# Patient Record
Sex: MALE | Race: WHITE | Employment: FULL TIME | URBAN - METROPOLITAN AREA
[De-identification: names, ages, dates, MRNs, and addresses within clinical notes are randomized per-mention and may not be internally consistent; named-entity substitution may affect disease eponyms.]

---

## 2017-06-12 ENCOUNTER — OFFICE VISIT (OUTPATIENT)
Dept: FAMILY MEDICINE CLINIC | Age: 42
End: 2017-06-12

## 2017-06-12 ENCOUNTER — HOSPITAL ENCOUNTER (OUTPATIENT)
Dept: GENERAL RADIOLOGY | Age: 42
Discharge: HOME | End: 2017-06-12

## 2017-06-12 VITALS
SYSTOLIC BLOOD PRESSURE: 108 MMHG | DIASTOLIC BLOOD PRESSURE: 78 MMHG | WEIGHT: 195 LBS | BODY MASS INDEX: 27.3 KG/M2 | HEIGHT: 71 IN | HEART RATE: 76 BPM | OXYGEN SATURATION: 98 %

## 2017-06-12 DIAGNOSIS — M54.41 ACUTE BILATERAL LOW BACK PAIN WITH RIGHT-SIDED SCIATICA: Primary | ICD-10-CM

## 2017-06-12 DIAGNOSIS — M54.41 ACUTE BILATERAL LOW BACK PAIN WITH RIGHT-SIDED SCIATICA: ICD-10-CM

## 2017-06-12 PROCEDURE — 99213 OFFICE O/P EST LOW 20 MIN: CPT | Performed by: NURSE PRACTITIONER

## 2017-06-12 RX ORDER — BACLOFEN 10 MG/1
10 TABLET ORAL 3 TIMES DAILY
Qty: 30 TABLET | Refills: 0 | Status: SHIPPED | OUTPATIENT
Start: 2017-06-12 | End: 2017-06-22

## 2017-06-12 RX ORDER — METHYLPREDNISOLONE 4 MG/1
TABLET ORAL
Qty: 1 KIT | Refills: 0 | Status: SHIPPED | OUTPATIENT
Start: 2017-06-12 | End: 2018-10-10 | Stop reason: ALTCHOICE

## 2017-06-12 ASSESSMENT — ENCOUNTER SYMPTOMS
BACK PAIN: 1
BOWEL INCONTINENCE: 0

## 2018-10-10 ENCOUNTER — OFFICE VISIT (OUTPATIENT)
Dept: FAMILY MEDICINE CLINIC | Age: 43
End: 2018-10-10
Payer: OTHER GOVERNMENT

## 2018-10-10 VITALS
HEART RATE: 72 BPM | DIASTOLIC BLOOD PRESSURE: 80 MMHG | WEIGHT: 186 LBS | OXYGEN SATURATION: 97 % | SYSTOLIC BLOOD PRESSURE: 118 MMHG | HEIGHT: 71 IN | BODY MASS INDEX: 26.04 KG/M2

## 2018-10-10 DIAGNOSIS — M51.36 DDD (DEGENERATIVE DISC DISEASE), LUMBAR: ICD-10-CM

## 2018-10-10 DIAGNOSIS — M54.41 ACUTE RIGHT-SIDED LOW BACK PAIN WITH RIGHT-SIDED SCIATICA: Primary | ICD-10-CM

## 2018-10-10 PROCEDURE — 99213 OFFICE O/P EST LOW 20 MIN: CPT | Performed by: NURSE PRACTITIONER

## 2018-10-10 RX ORDER — METHYLPREDNISOLONE 4 MG/1
TABLET ORAL
Qty: 1 KIT | Refills: 0 | Status: SHIPPED | OUTPATIENT
Start: 2018-10-10 | End: 2018-11-27 | Stop reason: ALTCHOICE

## 2018-10-10 RX ORDER — CYCLOBENZAPRINE HCL 10 MG
10 TABLET ORAL 3 TIMES DAILY PRN
Qty: 30 TABLET | Refills: 0 | Status: SHIPPED | OUTPATIENT
Start: 2018-10-10 | End: 2018-10-20

## 2018-10-10 ASSESSMENT — ENCOUNTER SYMPTOMS
NAUSEA: 0
VOMITING: 0
COUGH: 0
SHORTNESS OF BREATH: 0
DIARRHEA: 0
BACK PAIN: 1

## 2018-10-10 ASSESSMENT — PATIENT HEALTH QUESTIONNAIRE - PHQ9
1. LITTLE INTEREST OR PLEASURE IN DOING THINGS: 0
SUM OF ALL RESPONSES TO PHQ QUESTIONS 1-9: 0
2. FEELING DOWN, DEPRESSED OR HOPELESS: 0
SUM OF ALL RESPONSES TO PHQ QUESTIONS 1-9: 0
SUM OF ALL RESPONSES TO PHQ9 QUESTIONS 1 & 2: 0

## 2018-10-10 NOTE — PROGRESS NOTES
deficit. Skin: Skin is warm and dry. He is not diaphoretic. Nursing note and vitals reviewed. Vitals:    10/10/18 1429   BP: 118/80   Site: Left Upper Arm   Position: Sitting   Pulse: 72   SpO2: 97%   Weight: 186 lb (84.4 kg)   Height: 5' 11\" (1.803 m)       Assessment/Plan:  1. Acute right-sided low back pain with right-sided sciatica  - cyclobenzaprine (FLEXERIL) 10 MG tablet; Take 1 tablet by mouth 3 times daily as needed for Muscle spasms  Dispense: 30 tablet; Refill: 0  - methylPREDNISolone (MEDROL DOSEPACK) 4 MG tablet; Take by mouth. Dispense: 1 kit; Refill: 0    2. DDD (degenerative disc disease), lumbar  - cyclobenzaprine (FLEXERIL) 10 MG tablet; Take 1 tablet by mouth 3 times daily as needed for Muscle spasms  Dispense: 30 tablet; Refill: 0  - methylPREDNISolone (MEDROL DOSEPACK) 4 MG tablet; Take by mouth. Dispense: 1 kit; Refill: 0    Patient Instructions   Medrol dose pack (oral steroid taper)- you should avoid taking NSAIDs while on this medication (ex: ibuprofen, aleve, aspirin). Tylenol is OK to take. Flexeril muscle relaxer three times daily as needed  Continue with heat and cold therapy   Return if no improvement       Outpatient Encounter Prescriptions as of 10/10/2018   Medication Sig Dispense Refill    cyclobenzaprine (FLEXERIL) 10 MG tablet Take 1 tablet by mouth 3 times daily as needed for Muscle spasms 30 tablet 0    methylPREDNISolone (MEDROL DOSEPACK) 4 MG tablet Take by mouth. 1 kit 0    [DISCONTINUED] methylPREDNISolone (MEDROL DOSEPACK) 4 MG tablet Take by mouth. 1 kit 0    [DISCONTINUED] oxyCODONE-acetaminophen (PERCOCET) 5-325 MG per tablet Take 1 tablet by mouth every 8 hours as needed. 40 tablet 0     No facility-administered encounter medications on file as of 10/10/2018.       Ba Hernandez, CNP

## 2018-11-27 ENCOUNTER — OFFICE VISIT (OUTPATIENT)
Dept: FAMILY MEDICINE CLINIC | Age: 43
End: 2018-11-27
Payer: OTHER GOVERNMENT

## 2018-11-27 VITALS
WEIGHT: 189 LBS | SYSTOLIC BLOOD PRESSURE: 130 MMHG | TEMPERATURE: 98 F | OXYGEN SATURATION: 99 % | DIASTOLIC BLOOD PRESSURE: 88 MMHG | RESPIRATION RATE: 16 BRPM | BODY MASS INDEX: 26.36 KG/M2 | HEART RATE: 97 BPM

## 2018-11-27 DIAGNOSIS — M25.571 ACUTE RIGHT ANKLE PAIN: Primary | ICD-10-CM

## 2018-11-27 PROCEDURE — 99213 OFFICE O/P EST LOW 20 MIN: CPT | Performed by: NURSE PRACTITIONER

## 2018-11-27 RX ORDER — NAPROXEN 500 MG/1
500 TABLET ORAL 2 TIMES DAILY WITH MEALS
COMMUNITY
End: 2021-01-20 | Stop reason: ALTCHOICE

## 2018-11-27 ASSESSMENT — PATIENT HEALTH QUESTIONNAIRE - PHQ9
SUM OF ALL RESPONSES TO PHQ QUESTIONS 1-9: 0
1. LITTLE INTEREST OR PLEASURE IN DOING THINGS: 0
SUM OF ALL RESPONSES TO PHQ9 QUESTIONS 1 & 2: 0
2. FEELING DOWN, DEPRESSED OR HOPELESS: 0
SUM OF ALL RESPONSES TO PHQ QUESTIONS 1-9: 0

## 2018-11-27 ASSESSMENT — ENCOUNTER SYMPTOMS: RESPIRATORY NEGATIVE: 1

## 2018-11-28 DIAGNOSIS — M25.571 ACUTE RIGHT ANKLE PAIN: Primary | ICD-10-CM

## 2018-12-03 ENCOUNTER — HOSPITAL ENCOUNTER (OUTPATIENT)
Dept: MRI IMAGING | Age: 43
Discharge: HOME | End: 2018-12-03
Payer: OTHER GOVERNMENT

## 2018-12-03 DIAGNOSIS — M25.571 ACUTE RIGHT ANKLE PAIN: ICD-10-CM

## 2018-12-03 PROCEDURE — 73721 MRI JNT OF LWR EXTRE W/O DYE: CPT

## 2018-12-10 ENCOUNTER — TELEPHONE (OUTPATIENT)
Dept: FAMILY MEDICINE CLINIC | Age: 43
End: 2018-12-10

## 2018-12-10 DIAGNOSIS — M79.671 RIGHT FOOT PAIN: Primary | ICD-10-CM

## 2018-12-11 ENCOUNTER — OFFICE VISIT (OUTPATIENT)
Dept: ORTHOPEDIC SURGERY | Age: 43
End: 2018-12-11
Payer: OTHER GOVERNMENT

## 2018-12-11 VITALS
BODY MASS INDEX: 26.45 KG/M2 | DIASTOLIC BLOOD PRESSURE: 82 MMHG | HEIGHT: 71 IN | WEIGHT: 188.93 LBS | SYSTOLIC BLOOD PRESSURE: 123 MMHG | HEART RATE: 79 BPM

## 2018-12-11 DIAGNOSIS — M25.371 ANKLE INSTABILITY, RIGHT: ICD-10-CM

## 2018-12-11 DIAGNOSIS — M79.671 RIGHT FOOT PAIN: Primary | ICD-10-CM

## 2018-12-11 DIAGNOSIS — S86.301A PERONEAL TENDON INJURY, RIGHT, INITIAL ENCOUNTER: ICD-10-CM

## 2018-12-11 PROBLEM — S86.302A PERONEAL TENDON INJURY, LEFT, INITIAL ENCOUNTER: Status: ACTIVE | Noted: 2018-12-11

## 2018-12-11 PROCEDURE — L1902 AFO ANKLE GAUNTLET PRE OTS: HCPCS | Performed by: ORTHOPAEDIC SURGERY

## 2018-12-11 PROCEDURE — 99243 OFF/OP CNSLTJ NEW/EST LOW 30: CPT | Performed by: ORTHOPAEDIC SURGERY

## 2018-12-11 RX ORDER — IBUPROFEN 800 MG/1
1000 TABLET ORAL
COMMUNITY

## 2018-12-11 RX ORDER — PREDNISONE 10 MG/1
TABLET ORAL
Qty: 14 TABLET | Refills: 0 | Status: SHIPPED | OUTPATIENT
Start: 2018-12-11 | End: 2020-05-05

## 2018-12-11 RX ORDER — NAPROXEN 500 MG/1
500 TABLET ORAL
COMMUNITY
Start: 2018-11-19 | End: 2021-01-20 | Stop reason: ALTCHOICE

## 2018-12-12 ENCOUNTER — TELEPHONE (OUTPATIENT)
Dept: ORTHOPEDIC SURGERY | Age: 43
End: 2018-12-12

## 2018-12-12 NOTE — TELEPHONE ENCOUNTER
12[place]12/18  [facility] [place]  NO PRECERT REQUIRED - PER RECORDING ([phone] ) AND ONLINE [employer]  - NDS

## 2020-05-05 ENCOUNTER — TELEPHONE (OUTPATIENT)
Dept: FAMILY MEDICINE CLINIC | Age: 45
End: 2020-05-05

## 2020-05-05 ENCOUNTER — VIRTUAL VISIT (OUTPATIENT)
Dept: FAMILY MEDICINE CLINIC | Age: 45
End: 2020-05-05
Payer: OTHER GOVERNMENT

## 2020-05-05 PROCEDURE — 99214 OFFICE O/P EST MOD 30 MIN: CPT | Performed by: FAMILY MEDICINE

## 2020-05-05 RX ORDER — VALACYCLOVIR HYDROCHLORIDE 500 MG/1
500 TABLET, FILM COATED ORAL 2 TIMES DAILY
Qty: 6 TABLET | Refills: 5 | Status: SHIPPED | OUTPATIENT
Start: 2020-05-05 | End: 2020-05-08

## 2020-10-19 ASSESSMENT — PATIENT HEALTH QUESTIONNAIRE - PHQ9
SUM OF ALL RESPONSES TO PHQ9 QUESTIONS 1 & 2: 0
SUM OF ALL RESPONSES TO PHQ QUESTIONS 1-9: 0
1. LITTLE INTEREST OR PLEASURE IN DOING THINGS: 0
2. FEELING DOWN, DEPRESSED OR HOPELESS: 0

## 2020-10-20 ENCOUNTER — VIRTUAL VISIT (OUTPATIENT)
Dept: FAMILY MEDICINE CLINIC | Age: 45
End: 2020-10-20
Payer: OTHER GOVERNMENT

## 2020-10-20 PROCEDURE — 99214 OFFICE O/P EST MOD 30 MIN: CPT | Performed by: FAMILY MEDICINE

## 2020-10-20 NOTE — Clinical Note
I referred Sid Weiner to hand surgeon and MRI. He has [employer], so do the protocol for the referral if he needs it before he can schedule.

## 2020-10-20 NOTE — PROGRESS NOTES
TELEHEALTH EVALUATION -- Audio/Visual (During JGOGA-59 public health emergency)    HPI:  The patient has requested an audio/video evaluation for the following concern(s): This is a follow-up from the ER. Hand pain  Was getting up from sitting. Put weight on his hand. It gave out. Got worse over past 2 weeks,   Then it got much worse and had pain burning type pain. felt like a torch was on it  Went to ER at that point. Got an xray. No fractue. Put in brace  The brace helps when he wears it. When he takes it off, if deep sore pain. Some motions making it worse. Goes from knuckles to forearm with pain and weakness. using ibuprofen which it is not helping. Denys Fish He has had a EMG in the past due to shoulder pain and does have neuropathy in his shoulder, right. Past Medical History:   Diagnosis Date    Lumbar stenosis     Respiratory arrest ([facility] [address]     due to an jaw absess       Social History     Socioeconomic History    Marital status:      Spouse name: Not on file    Number of children: Not on file    Years of education: Not on file    Highest education level: Not on file   Occupational History    Not on file   Social Needs    Financial resource strain: Not on file    Food insecurity     Worry: Not on file     Inability: Not on file    Transportation needs     Medical: Not on file     Non-medical: Not on file   Tobacco Use    Smoking status: Former Smoker     Last attempt to quit: 2012     Years since quittin.5    Smokeless tobacco: Current User   Substance and Sexual Activity    Alcohol use:  Yes     Alcohol/week: 0.0 standard drinks     Comment: social    Drug use: No    Sexual activity: Not on file   Lifestyle    Physical activity     Days per week: Not on file     Minutes per session: Not on file    Stress: Not on file   Relationships    Social connections     Talks on phone: Not on file     Gets together: Not on file     Attends Jewish service: Not on file signs of ataxia         [x] Normal range of motion of neck        [] Abnormal-   Decreased rom, wrist in splint    Neurological:        [x] No Facial Asymmetry (Cranial nerve 7 motor function) (limited exam to video visit)          [x] No gaze palsy        [] Abnormal-         Skin:        [x] No significant exanthematous lesions or discoloration noted on facial skin         [] Abnormal-            Psychiatric:       [x] Normal Affect [] No Hallucinations        [] Abnormal-   Judgment, behavior, thought and mood are normal.     (During VPDZS-08 public health emergency), evaluation of the following organ systems was limited: Vitals/Constitutional/EENT/Resp/CV/GI//MS/Neuro/Skin/Heme-Lymph-Imm. Pursuant to the emergency declaration under the [address] [place] [address] and the [employer] Act, this Virtual Visit was conducted with patient's (and/or legal guardian's) consent, to reduce the patient's risk of exposure to COVID-19 and provide necessary medical care. The patient (and/or legal guardian) has also been advised to contact this office for worsening conditions or problems, and seek emergency medical treatment and/or call 911 if deemed necessary. Patient initiated the encounter and gave consent for the encounter. Services were provided through a video synchronous discussion virtually to substitute for in-person clinic visit. Patient and provider were located at their individual homes. Diagnosis Orders   1. Wrist pain, acute, right  Ellen Pichardo MD, Hand Surgery (Hand, Wrist, Elbow), [facility] WRIST RIGHT WO CONTRAST   2.  Wrist weakness  Kathleen Cavanaugh MD, Hand Surgery (Hand, Wrist, Elbow), [facility] WRIST RIGHT WO CONTRAST   3. Neuralgia  Ellen Pichardo MD, Hand Surgery (Hand, Wrist, Elbow), [facility] WRIST RIGHT WO CONTRAST     Zachary Jones was seen today for follow-up from hospital.    Diagnoses and all orders for this visit:    Wrist pain, acute, right  -     Adelso Shirley MD, Hand Surgery (Hand, Wrist, Elbow), [facility]  -     [facility] WRIST RIGHT WO CONTRAST; Future    Wrist weakness  -     Adelso Shirley MD, Hand Surgery (Hand, Wrist, Elbow), [facility]  -     MRI WRIST RIGHT WO CONTRAST; Future    Neuralgia  -     Adelso Shirley MD, Hand Surgery (Hand, Wrist, Elbow), [facility]  -     [facility] WRIST RIGHT WO CONTRAST; Future          Wrist pain, acute, right, not improving with time associated with weakness  -     Adelso Shirley MD, Hand Surgery (Hand, Wrist, Elbow), [facility]  -     [facility] WRIST RIGHT WO CONTRAST; Future    I will hold off on steroids at this point because pain is controlled when he is wearing the splint and ortho may want to do an injection. He has  insurance and may need approval before the specialist viist. Note sent to staff to begin paperwork on the referral if referral still needed for . I recommend continue the splint on and follow-up with orthopedics.  Mri ordered  15 minute time spent

## 2020-10-26 ENCOUNTER — HOSPITAL ENCOUNTER (OUTPATIENT)
Dept: MRI IMAGING | Age: 45
Discharge: HOME | End: 2020-10-26
Payer: OTHER GOVERNMENT

## 2020-10-26 PROCEDURE — 73221 MRI JOINT UPR EXTREM W/O DYE: CPT

## 2020-11-02 ENCOUNTER — OFFICE VISIT (OUTPATIENT)
Dept: ORTHOPEDIC SURGERY | Age: 45
End: 2020-11-02
Payer: OTHER GOVERNMENT

## 2020-11-02 VITALS — BODY MASS INDEX: 26.32 KG/M2 | WEIGHT: 188 LBS | HEIGHT: 71 IN | RESPIRATION RATE: 16 BRPM

## 2020-11-02 PROBLEM — M25.531 RIGHT WRIST PAIN: Status: ACTIVE | Noted: 2020-11-02

## 2020-11-02 PROBLEM — S69.81XA TFC (TRIANGULAR FIBROCARTILAGE COMPLEX) INJURY, RIGHT, INITIAL ENCOUNTER: Status: ACTIVE | Noted: 2020-11-02

## 2020-11-02 PROBLEM — M77.8 RIGHT WRIST TENDINITIS: Status: ACTIVE | Noted: 2020-11-02

## 2020-11-02 PROCEDURE — 20550 NJX 1 TENDON SHEATH/LIGAMENT: CPT | Performed by: ORTHOPAEDIC SURGERY

## 2020-11-02 PROCEDURE — 99243 OFF/OP CNSLTJ NEW/EST LOW 30: CPT | Performed by: ORTHOPAEDIC SURGERY

## 2020-11-02 RX ORDER — MELOXICAM 15 MG/1
TABLET ORAL
COMMUNITY
Start: 2020-10-13 | End: 2021-01-20 | Stop reason: ALTCHOICE

## 2020-11-02 RX ORDER — BETAMETHASONE SODIUM PHOSPHATE AND BETAMETHASONE ACETATE 3; 3 MG/ML; MG/ML
12 INJECTION, SUSPENSION INTRA-ARTICULAR; INTRALESIONAL; INTRAMUSCULAR; SOFT TISSUE ONCE
Status: COMPLETED | OUTPATIENT
Start: 2020-11-02 | End: 2020-11-02

## 2020-11-02 RX ORDER — LIDOCAINE HYDROCHLORIDE 10 MG/ML
1 INJECTION, SOLUTION INFILTRATION; PERINEURAL ONCE
Status: COMPLETED | OUTPATIENT
Start: 2020-11-02 | End: 2020-11-02

## 2020-11-02 RX ADMIN — BETAMETHASONE SODIUM PHOSPHATE AND BETAMETHASONE ACETATE 12 MG: 3; 3 INJECTION, SUSPENSION INTRA-ARTICULAR; INTRALESIONAL; INTRAMUSCULAR; SOFT TISSUE at 13:22

## 2020-11-02 RX ADMIN — LIDOCAINE HYDROCHLORIDE 1 ML: 10 INJECTION, SOLUTION INFILTRATION; PERINEURAL at 13:23

## 2020-11-02 NOTE — PROGRESS NOTES
Chief Complaint  Wrist Pain (NP RT WRIST)      History of Present Illness:  Bel Ly is a 39 y.o. male. He presents today for a new hand surgery specialty consultation at the request of Dr. Davi Herrera regarding his right wrist.   He remembers about 4 weeks ago simply pushing off of his bed with an outstretched wrist and he felt a pop along the ulnar side of the wrist.  Ever since that time he has intermittently used a brace and taken anti-inflammatories but he continues to have pain with certain twisting and gripping motions. He has not been feeling clicking or catching. He did recently have an MRI performed and I reviewed this with him as well as the radiology report which was more consistent with some degenerative changes along the TFC and the base of the thumb but no signs of any unstable tear or tendon injury. Medical History:  Patient's medications, allergies, past medical, surgical, social and family histories were reviewed and updated as appropriate. Review of Systems  Pertinent items are noted in HPI  Denies fever, chills, confusion, bowel/bladder active change. Review of systems reviewed from Patient History Form dated on 11/2/20 and available in the patient's chart under the Media tab. Vital Signs  Vitals:    11/02/20 1258   Resp: 16       General Exam:   Constitutional: Patient is adequately groomed with no evidence of malnutrition  Mental Status: The patient is oriented to time, place and person. The patient's mood and affect are appropriate. Wrist Examination    Inspection: There is no discoloration or swelling about the wrist    Palpation: There is reproducible point tenderness mostly along the extensor carpi ulnaris but also between the ECU and FCU at the ulnar wrist.  There is no pain over the distal ulna or distal radius. There is some tenderness along the ECU just proximal to the ulnar styloid as well.     Range of Motion: Full range of motion but tenderness in the extremes of wrist extension and supination. Strength: Normal    Special Tests: ECU Synergy sign is somewhat equivocal.  DRUJ remains stable    Skin: There are no additional worrisome rashes, ulcerations or lesions. Sensation: normal    Circulation normal    Additional Comments:     Additional Examinations:  Left Upper Extremity: Examination of the left upper extremity does not show any tenderness, deformity or injury. Range of motion is unremarkable. There is no gross instability. There are no rashes, ulcerations or lesions. Strength and tone are normal.    Radiology:     X-rays obtained and reviewed in office:  Views    Location    Impression      Diagnostic Test Findings: MRI as noted above      Assessment: Right ulnar wrist pain consistent with elements of TFC sprain and extensor carpi ulnaris tendinitis    Impression:  Encounter Diagnoses   Name Primary?  Right wrist pain     Right wrist tendinitis     TFC (triangular fibrocartilage complex) injury, right, initial encounter        Office Procedures:  Orders Placed This Encounter   Procedures    KS INJECT TENDON SHEATH/LIGAMENT       Treatment Plan: I discussed the diagnosis with the patient and my belief that these conditions may take a very long time to finally resolve. As this has already been 4 weeks with otherwise appropriate conservative care, I have offered him a cortisone injection today in the office, followed by formalized hand therapy. Under sterile conditions, I injected the right ulnar wrist along the extensor carpi ulnaris sheath and TFC with 1% lidocaine and 2 mL of Celestone. Appropriate injection risks and instructions were discussed. We will have him begin his hand therapy and then as his comfort would be expected to improve he may start moving away from his brace. Again, he does understand it may take many weeks and even a few months to resolve.   If over the next 3 months he is not seeing significant improvement, I will see him back for repeat evaluation and consideration of other interventions such as wrist arthroscopy. Please note that this transcription was created using voice recognition software. Any errors are unintentional and may be due to voice recognition transcription.

## 2021-01-14 ENCOUNTER — OFFICE VISIT (OUTPATIENT)
Dept: ORTHOPEDIC SURGERY | Age: 46
End: 2021-01-14
Payer: OTHER GOVERNMENT

## 2021-01-14 VITALS — HEIGHT: 71 IN | BODY MASS INDEX: 26.32 KG/M2 | WEIGHT: 188 LBS

## 2021-01-14 DIAGNOSIS — S69.81XA TFC (TRIANGULAR FIBROCARTILAGE COMPLEX) INJURY, RIGHT, INITIAL ENCOUNTER: ICD-10-CM

## 2021-01-14 DIAGNOSIS — M77.8 RIGHT WRIST TENDINITIS: Primary | ICD-10-CM

## 2021-01-14 PROCEDURE — 99213 OFFICE O/P EST LOW 20 MIN: CPT | Performed by: ORTHOPAEDIC SURGERY

## 2021-01-14 RX ORDER — MELOXICAM 15 MG/1
15 TABLET ORAL DAILY PRN
Qty: 30 TABLET | Refills: 5 | Status: SHIPPED | OUTPATIENT
Start: 2021-01-14 | End: 2021-01-20 | Stop reason: ALTCHOICE

## 2021-01-14 NOTE — PROGRESS NOTES
Chief Complaint:  Wrist Pain (RIGHT WRIST- INJECTION 11/2/2020)      History of Present of Illness: The patient returns and reports that when I last saw him we did the injection and he had effectively complete relief over the ulnar wrist.  In the last 2 weeks without any specific injury he has started to feel some occasional ulnar-sided wrist pain even without specific activity. Review of Systems  Pertinent items are noted in HPI  Denies fever, chills, confusion, bowel/bladder active change. Review of systems reviewed from Patient History Form dated on 11/2/2020 and available in the patient's chart under the Media tab. Examination:  On exam today he does have focus tenderness between the FCU and ECU especially with ulnar deviation. ECU Synergy sign is negative and really does not reproduce any pain. He has some chronic loss of sensation to the small finger which he states has been there for many years. Fingers remain perfused and otherwise sensate. Radiology:     X-rays obtained and reviewed in office:  Views    Location    Impression      No orders of the defined types were placed in this encounter. Impression:  Encounter Diagnoses   Name Primary?  Right wrist tendinitis Yes    TFC (triangular fibrocartilage complex) injury, right, initial encounter          Treatment Plan:  Once again I reviewed with him that on his MRI there was no full-thickness TFC tear or ECU degeneration. I do suspect this is mostly some TFC inflammation at this juncture, and I have recommended anti-inflammatories and some formalized therapy. It is our hope together that this can turn the corner over the next several weeks with conservative care. In the weeks that follow if he simply does not see forward progress, we would always retain the option to sit down and discuss wrist arthroscopy with debridement of the TFC with or without ECU decompression. Please note that this transcription was created using voice recognition software. Any errors are unintentional and may be due to voice recognition transcription.

## 2021-01-20 ENCOUNTER — VIRTUAL VISIT (OUTPATIENT)
Dept: FAMILY MEDICINE CLINIC | Age: 46
End: 2021-01-20
Payer: OTHER GOVERNMENT

## 2021-01-20 ENCOUNTER — TELEPHONE (OUTPATIENT)
Dept: FAMILY MEDICINE CLINIC | Age: 46
End: 2021-01-20

## 2021-01-20 DIAGNOSIS — S39.012A ACUTE MYOFASCIAL STRAIN OF LUMBAR REGION, INITIAL ENCOUNTER: Primary | ICD-10-CM

## 2021-01-20 PROCEDURE — 99213 OFFICE O/P EST LOW 20 MIN: CPT | Performed by: FAMILY MEDICINE

## 2021-01-20 ASSESSMENT — PATIENT HEALTH QUESTIONNAIRE - PHQ9
SUM OF ALL RESPONSES TO PHQ9 QUESTIONS 1 & 2: 0
SUM OF ALL RESPONSES TO PHQ QUESTIONS 1-9: 0
2. FEELING DOWN, DEPRESSED OR HOPELESS: 0
1. LITTLE INTEREST OR PLEASURE IN DOING THINGS: 0

## 2021-01-20 NOTE — TELEPHONE ENCOUNTER
----- Message from Emilie Potts DO sent at 1/20/2021  1:07 PM EST -----  I wrote a note off work for Evelyn Damon, please make sure he can get it, fax or email etc. thanks

## 2021-01-20 NOTE — PROGRESS NOTES
TELEHEALTH EVALUATION -- Audio/Visual (During KIH-70 public health emergency)    HPI:  The patient has requested an audio/video evaluation for the following concern(s):  Chief Complaint   Patient presents with    Back Pain     feels like pulled muscle lifting / happended monday    He was moving a very heavy TV by himself and felt pain in his low back. This is a recurrence and he has had similar pain in his back previously. He has had surgery on his back as well. He does not feel like he has torn any muscles or affected his surgery site but he does have pain and tenderness in his lower back. He is off work due to this and needs a note to return to work. I recommend he continue ibuprofen prescription strength at 800 mg up to 3 times a day for pain, and he can use ice on his back. And a note is written for him to be off work until Monday, 25 January.     Previous lumbar is reviewed from 2017  EXAMINATION:   3 VIEWS OF THE LUMBAR SPINE       6/12/2017 2:32 pm       COMPARISON:   09/25/2013, 10/09/2013       HISTORY:   ORDERING PHYSICIAN PROVIDED HISTORY: Acute bilateral low back pain with   right-sided sciatica   TECHNOLOGIST PROVIDED HISTORY:   Technologist Provided Reason for Exam: acute low back pain, recent weight   loss 55lbs over last 5 months,s/p back fusion       FINDINGS:   There are postoperative changes of fusion from L4-S1 with bilateral pedicle   screws.  Hardware appears intact with no evidence of loosening.  Alignment of   the vertebral bodies is unchanged from preoperative examination.  Disc spaces   at the unfused levels are maintained.  Minimal facet hypertrophy noted at   L2-L3 and L3-L4.           Impression   Status post fusion of L4-S1.  No evidence of acute osseous or hardware   abnormality.  Minimal degenerative change in the unfused levels.         Past Medical History:   Diagnosis Date    Lumbar stenosis     Respiratory arrest ([facility] [address] 2012    due to an jaw absess (During CPDAY-25 public health emergency), evaluation of the following organ systems was limited: Vitals/Constitutional/EENT/Resp/CV/GI//MS/Neuro/Skin/Heme-Lymph-Imm. Pursuant to the emergency declaration under the [facility], [address] [place] [address] and the [employer] Act, this Virtual Visit was conducted with patient's (and/or legal guardian's) consent, to reduce the patient's risk of exposure to COVID-19 and provide necessary medical care. The patient (and/or legal guardian) has also been advised to contact this office for worsening conditions or problems, and seek emergency medical treatment and/or call 911 if deemed necessary. Patient initiated the encounter and gave consent for the encounter. Services were provided through a video synchronous discussion virtually to substitute for in-person clinic visit. Patient and provider were located at their individual homes. Diagnosis Orders   1. Acute myofascial strain of lumbar region, initial encounter           Acute myofascial strain of lumbar region, current, with history of degenerative disc disease and surgery in his lumbar spine. Recommend continuing ibuprofen ice to the back and staying off work until the 25th no written:    Slim Guillen for Maxine Macias to be off work until January 25, 2021 and can return to work then without restrictions as tolerated. Note written to be off work.   20 minute time spent

## 2021-01-20 NOTE — LETTER
[facility], [address] [place] [address]  Phone: [phone]  Fax: [phone]    Roselia Geller DO        January 20, 2021     Patient: Laure Hodgkin   YOB: 1975   Date of Visit: 1/20/2021       To Whom it May Concern:    Booker Espinoza was seen in my clinic on 1/20/2021. He can return to work without limitations on Monday January 25, 2021 as tolerated.        Sincerely,            Roselia Geller DO

## 2021-03-25 RX ORDER — METHYLPREDNISOLONE 4 MG/1
TABLET ORAL
Qty: 1 KIT | Refills: 0 | Status: CANCELLED | OUTPATIENT
Start: 2021-03-25

## 2021-03-26 ENCOUNTER — HOSPITAL ENCOUNTER (OUTPATIENT)
Dept: GENERAL RADIOLOGY | Age: 46
Discharge: HOME | End: 2021-03-26
Payer: OTHER GOVERNMENT

## 2021-03-26 ENCOUNTER — OFFICE VISIT (OUTPATIENT)
Dept: FAMILY MEDICINE CLINIC | Age: 46
End: 2021-03-26
Payer: OTHER GOVERNMENT

## 2021-03-26 VITALS
OXYGEN SATURATION: 98 % | BODY MASS INDEX: 33.46 KG/M2 | SYSTOLIC BLOOD PRESSURE: 128 MMHG | TEMPERATURE: 98.9 F | DIASTOLIC BLOOD PRESSURE: 86 MMHG | HEIGHT: 71 IN | WEIGHT: 239 LBS | HEART RATE: 88 BPM

## 2021-03-26 DIAGNOSIS — M25.561 ACUTE PAIN OF RIGHT KNEE: Primary | ICD-10-CM

## 2021-03-26 DIAGNOSIS — M25.561 ACUTE PAIN OF RIGHT KNEE: ICD-10-CM

## 2021-03-26 PROCEDURE — 73560 X-RAY EXAM OF KNEE 1 OR 2: CPT

## 2021-03-26 PROCEDURE — 99214 OFFICE O/P EST MOD 30 MIN: CPT | Performed by: FAMILY MEDICINE

## 2021-03-26 NOTE — PROGRESS NOTES
21      Javier Carry  Chief Complaint   Patient presents with    Knee Injury     right knee/ last thursday/ swollen / keeps popping   injured right knee    Does have hx of right  foot surgery and degenerative changes, reviewed MRI 2018 notes  Has used meloxicanm in past, helped for similar problems  Climbed down from ladder and came down too hard on right leg. Past Medical History:   Diagnosis Date    Lumbar stenosis     Respiratory arrest ([facility] [address]     due to an jaw absess     Social History     Socioeconomic History    Marital status:      Spouse name: Not on file    Number of children: Not on file    Years of education: Not on file    Highest education level: Not on file   Occupational History    Not on file   Social Needs    Financial resource strain: Not on file    Food insecurity     Worry: Not on file     Inability: Not on file    Transportation needs     Medical: Not on file     Non-medical: Not on file   Tobacco Use    Smoking status: Former Smoker     Quit date: 2012     Years since quittin.9    Smokeless tobacco: Current User     Types: Chew   Substance and Sexual Activity    Alcohol use:  Yes     Alcohol/week: 0.0 standard drinks     Comment: social    Drug use: No    Sexual activity: Not on file   Lifestyle    Physical activity     Days per week: Not on file     Minutes per session: Not on file    Stress: Not on file   Relationships    Social connections     Talks on phone: Not on file     Gets together: Not on file     Attends Orthodox service: Not on file     Active member of club or organization: Not on file     Attends meetings of clubs or organizations: Not on file     Relationship status: Not on file    Intimate partner violence     Fear of current or ex partner: Not on file     Emotionally abused: Not on file     Physically abused: Not on file     Forced sexual activity: Not on file   Other Topics Concern    Not on file   Social History Narrative    Not on file           Current Outpatient Medications   Medication Sig Dispense Refill    ibuprofen (ADVIL;MOTRIN) 800 MG tablet Take 1,000 mg by mouth       No current facility-administered medications for this visit. Vitals:    03/26/21 0913   BP: 128/86   Pulse: 88   Temp: 98.9 °F (37.2 °C)   SpO2: 98%     BP Readings from Last 5 Encounters:   03/26/21 128/86   12/11/18 123/82   11/27/18 130/88   10/10/18 118/80   06/12/17 108/78       ROS:  No fever or chills  No unexpected wt loss  No headaches  No chest pain  No shortness of breath  No nausea, vomiting or diarrhea  No loss of coordination. Physical Exam  Physical Exam  Musculoskeletal:      Right knee: Tenderness found. Medial joint line tenderness noted. Legs:        Well developed well nourished patient   in no acute distress. Alert and oriented to person, place, and time. HEENT:Normocephalic, atraumatic. No scleral icterus. Pupils normal  Heart: Regular Rate and Rhythm. Respirations: lungs clear to auscultation bilaterally. Abdomin: Bowel sounds present. Extremities: No peripheral edema. No erythema. Diagnosis Orders   1. Acute pain of right knee  XR KNEE RIGHT (1-2 VIEWS)    Jayro Darling MD, Orthopedic Surgery (General)[facility]     Gama Messer was seen today for knee injury. Diagnoses and all orders for this visit:    Acute pain of right knee  -     XR KNEE RIGHT (1-2 VIEWS)  -     Jayro Darling MD, Orthopedic Surgery (General)[facility]    he will start meloxicam      An electronic signature was used to authenticate this note. This was dictated. Errors mightbe possible due to dictation.   --Richie Elizalde,

## 2021-03-29 ENCOUNTER — OFFICE VISIT (OUTPATIENT)
Dept: ORTHOPEDIC SURGERY | Age: 46
End: 2021-03-29
Payer: OTHER GOVERNMENT

## 2021-03-29 VITALS — BODY MASS INDEX: 33.46 KG/M2 | HEIGHT: 71 IN | WEIGHT: 239 LBS

## 2021-03-29 DIAGNOSIS — S83.91XA SPRAIN OF RIGHT KNEE, UNSPECIFIED LIGAMENT, INITIAL ENCOUNTER: Primary | ICD-10-CM

## 2021-03-29 DIAGNOSIS — S83.241A TEAR OF MEDIAL MENISCUS OF RIGHT KNEE, UNSPECIFIED TEAR TYPE, UNSPECIFIED WHETHER OLD OR CURRENT TEAR, INITIAL ENCOUNTER: ICD-10-CM

## 2021-03-29 PROCEDURE — 99242 OFF/OP CONSLTJ NEW/EST SF 20: CPT | Performed by: NURSE PRACTITIONER

## 2021-03-29 NOTE — PROGRESS NOTES
Subjective    Patient ID: Horacio Murrieta is a 39 y.o..  male. Chief Complaint   Patient presents with    Knee Pain     Pt was stepping down from a ladder missed step and landed with force on right knee, Pain leve 3 today, Patient bought knee brace when stepping popping , has been resting it over the weekend        Pain Assessment  Location of Pain: Knee  Location Modifiers: Right  Severity of Pain: 3  Quality of Pain: Popping  Duration of Pain: Persistent  Frequency of Pain: Constant  Date Pain First Started: 03/18/21  Aggravating Factors: Bending, Stretching, Straightening, Exercise, Kneeling, Squatting, Walking, Stairs, Standing  Limiting Behavior: Yes  Relieving Factors: Ice, Rest  Result of Injury: Yes  Work-Related Injury: No(patient states at this time there is no WC claim opened.)  Are there other pain locations you wish to document?: No    Knee Pain  Patient complains of right knee pain. Patient reports that he was at work when he stepped down off of a ladder. He felt that his knee jammed as his leg stayed straight. He does not believe he hyperextended or that his knee went one way or the other. Patient reports that this happened on the 18th. After it happened, his knee felt fine, however on the 19th started to get sore. He has been taking ibuprofen and using ice and rested over the weekend. Since then, however as he takes steps he often feels a pop after 5-10 steps. He reports that this has been decreasing over time and improving. He is also been using an economy hinged knee brace. 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 I am seeing this patient today as a consult referred to me by Dr. Aditya Arthur. Patient's medications, allergies, past medical, surgical, social and family histories were reviewed and updated as appropriate.      Physical Exam:   Constitutional:  Pt well groomed, no acute distress, well developed, no obvious deformities  Vitals:    03/29/21 1059   Weight: 239 lb (108.4 kg)   Height: 5' 11\" (1.803 m)     -Oriented to person, place, and time  -mood and affect are appropriate    Knee exam - right knee exam shows;    -range of motion of R. Knee is 0 to 110, and L. Knee is 0 to 110. The patient does not have  pain on motion  -there is a small effusion  - there is tenderness over the medial joint line region  -there are not any masses  - there is not ligamentous instability  -there is not  deformity noted. - tenderness laxity is not noted with varus or valgus stress.   -Mc Murrays testing negative  -Anterior/posterior drawer testing negative    Contralateral Exam:  -No obvious deformities  -No abrasions or cellulitis noted, NVI   -Full ROM   -No joint laxity  -no palpable tenderness noted    Neurological exam:   -Deep tendon reflexes are 2/4 at the knees and 2/4 at the ankles with strong extensor hallicus longus motor strength bilaterally. --Distal pulses DP/PT: R. 2+; L. 2+.     Skin exam:  There is not any cellulitis, lymphedema or cutaneous lesions noted in the lower extremities. Xray: 2 view of right knee obtained on 3/26/2021 reviewed today shows: No acute fractures or deformity; tricompartmental joint space well-maintained    Assessment: Right knee internal derangement, likely medial meniscus tear      Plan: The patient was given the paperwork to complete for Worker's Comp. He was advised to continue with rest, ice and ibuprofen. He was also given exercises to do at home and will continue with his brace. I am okay with him continuing to work but should likely avoid going up and down the ladder numerous times. If the patient's pain worsens or the popping worsens, he will follow-up with us at which time we will likely pursue an MRI. No orders of the defined types were placed in this encounter.

## 2021-08-23 ENCOUNTER — NURSE TRIAGE (OUTPATIENT)
Dept: OTHER | Facility: CLINIC | Age: 46
End: 2021-08-23

## 2021-08-23 NOTE — TELEPHONE ENCOUNTER
Reason for Disposition   MODERATE back pain (e.g., interferes with normal activities) and present > 3 days    Answer Assessment - Initial Assessment Questions  1. ONSET: \"When did the pain begin? \"       Late last night    2. LOCATION: \"Where does it hurt? \" (upper, mid or lower back)      Lower back, from center out towards the right. 3. SEVERITY: \"How bad is the pain? \"  (e.g., Scale 1-10; mild, moderate, or severe)    - MILD (1-3): doesn't interfere with normal activities     - MODERATE (4-7): interferes with normal activities or awakens from sleep     - SEVERE (8-10): excruciating pain, unable to do any normal activities       5-6/10 if not thinking about it. 4. PATTERN: \"Is the pain constant? \" (e.g., yes, no; constant, intermittent)       Comes and goes. 5. RADIATION: \"Does the pain shoot into your legs or elsewhere? \"      Denies    6. CAUSE:  \"What do you think is causing the back pain? \"       Pulled muscle    7. BACK OVERUSE:  Alveta Card recent lifting of heavy objects, strenuous work or exercise? \"      I was cleaning my garage. 8. MEDICATIONS: \"What have you taken so far for the pain? \" (e.g., nothing, acetaminophen, NSAIDS)      Ibuprofen and flexeril    9. NEUROLOGIC SYMPTOMS: \"Do you have any weakness, numbness, or problems with bowel/bladder control? \"      Numbness/tingling is present, but no different than normal.    10. OTHER SYMPTOMS: \"Do you have any other symptoms? \" (e.g., fever, abdominal pain, burning with urination, blood in urine)  Denies           11. PREGNANCY: \"Is there any chance you are pregnant? \" (e.g., yes, no; LMP)        no    Protocols used: BACK PAIN-ADULT-[place]    Received call from Ariadne at [facility] with Red Flag Complaint.     Brief description of triage: lower back pan, feels like pulled muscle    Triage indicates for patient to be seen within 3 days    Care advice provided, patient verbalizes understanding; denies any other questions or concerns; instructed to call back for any new or worsening symptoms. Writer provided warm transfer to Noni Coto at [facility] for appointment scheduling. Attention Provider: Thank you for allowing me to participate in the care of your patient. The patient was connected to triage in response to information provided to the E[facility]. Please do not respond through this encounter as the response is not directed to a shared pool.

## 2021-08-24 ENCOUNTER — OFFICE VISIT (OUTPATIENT)
Dept: FAMILY MEDICINE CLINIC | Age: 46
End: 2021-08-24
Payer: OTHER GOVERNMENT

## 2021-08-24 VITALS
HEART RATE: 84 BPM | TEMPERATURE: 98.9 F | SYSTOLIC BLOOD PRESSURE: 120 MMHG | BODY MASS INDEX: 32.92 KG/M2 | DIASTOLIC BLOOD PRESSURE: 86 MMHG | WEIGHT: 236 LBS | RESPIRATION RATE: 16 BRPM | OXYGEN SATURATION: 95 %

## 2021-08-24 DIAGNOSIS — J01.00 ACUTE MAXILLARY SINUSITIS, RECURRENCE NOT SPECIFIED: Primary | ICD-10-CM

## 2021-08-24 DIAGNOSIS — I10 ESSENTIAL HYPERTENSION: ICD-10-CM

## 2021-08-24 DIAGNOSIS — R11.0 NAUSEA: ICD-10-CM

## 2021-08-24 DIAGNOSIS — I10 HYPERTENSION, UNSPECIFIED TYPE: ICD-10-CM

## 2021-08-24 PROCEDURE — 99213 OFFICE O/P EST LOW 20 MIN: CPT | Performed by: NURSE PRACTITIONER

## 2021-08-24 RX ORDER — CYCLOBENZAPRINE HCL 10 MG
10 TABLET ORAL 3 TIMES DAILY PRN
Qty: 30 TABLET | Refills: 1 | Status: SHIPPED | OUTPATIENT
Start: 2021-08-24 | End: 2021-09-03

## 2021-08-24 SDOH — ECONOMIC STABILITY: INCOME INSECURITY: IN THE LAST 12 MONTHS, WAS THERE A TIME WHEN YOU WERE NOT ABLE TO PAY THE MORTGAGE OR RENT ON TIME?: NO

## 2021-08-24 SDOH — ECONOMIC STABILITY: TRANSPORTATION INSECURITY
IN THE PAST 12 MONTHS, HAS LACK OF TRANSPORTATION KEPT YOU FROM MEETINGS, WORK, OR FROM GETTING THINGS NEEDED FOR DAILY LIVING?: NO

## 2021-08-24 SDOH — ECONOMIC STABILITY: FOOD INSECURITY: WITHIN THE PAST 12 MONTHS, YOU WORRIED THAT YOUR FOOD WOULD RUN OUT BEFORE YOU GOT MONEY TO BUY MORE.: NEVER TRUE

## 2021-08-24 SDOH — ECONOMIC STABILITY: TRANSPORTATION INSECURITY
IN THE PAST 12 MONTHS, HAS THE LACK OF TRANSPORTATION KEPT YOU FROM MEDICAL APPOINTMENTS OR FROM GETTING MEDICATIONS?: NO

## 2021-08-24 SDOH — ECONOMIC STABILITY: FOOD INSECURITY: WITHIN THE PAST 12 MONTHS, THE FOOD YOU BOUGHT JUST DIDN'T LAST AND YOU DIDN'T HAVE MONEY TO GET MORE.: NEVER TRUE

## 2021-08-24 SDOH — ECONOMIC STABILITY: HOUSING INSECURITY
IN THE LAST 12 MONTHS, WAS THERE A TIME WHEN YOU DID NOT HAVE A STEADY PLACE TO SLEEP OR SLEPT IN A SHELTER (INCLUDING NOW)?: NO

## 2021-08-24 ASSESSMENT — SOCIAL DETERMINANTS OF HEALTH (SDOH): HOW HARD IS IT FOR YOU TO PAY FOR THE VERY BASICS LIKE FOOD, HOUSING, MEDICAL CARE, AND HEATING?: NOT HARD AT ALL

## 2021-08-24 ASSESSMENT — PATIENT HEALTH QUESTIONNAIRE - PHQ9
2. FEELING DOWN, DEPRESSED OR HOPELESS: 0
SUM OF ALL RESPONSES TO PHQ QUESTIONS 1-9: 0
1. LITTLE INTEREST OR PLEASURE IN DOING THINGS: 0
SUM OF ALL RESPONSES TO PHQ QUESTIONS 1-9: 0
SUM OF ALL RESPONSES TO PHQ9 QUESTIONS 1 & 2: 0
SUM OF ALL RESPONSES TO PHQ QUESTIONS 1-9: 0

## 2021-08-24 NOTE — PROGRESS NOTES
Subjective:      Chief Complaint   Patient presents with    Lower Back Pain     ongoing pain, flare started 3 days ago - finished Flexeril yesterday - used only as needed - now legs & back sore - last surgery was  - has rods & pins       Patient ID: Dalton Rogers is a 39 y.o. male who presents back pain. L4-L5-S1 fused . He believes he worked in the [employer] and made it it [employer]". He has tried to do stretching exercises to open it up but it is only helped a little bit. He uses heat and Tylenol to help with pain and does not want anything else. HPI    Family History   Problem Relation Age of Onset    Other Neg Hx        Social History     Socioeconomic History    Marital status:      Spouse name: Not on file    Number of children: Not on file    Years of education: Not on file    Highest education level: Not on file   Occupational History    Not on file   Tobacco Use    Smoking status: Former Smoker     Quit date: 2012     Years since quittin.4    Smokeless tobacco: Current User     Types: Chew   Vaping Use    Vaping Use: Never used   Substance and Sexual Activity    Alcohol use: Yes     Alcohol/week: 0.0 standard drinks     Comment: social    Drug use: No    Sexual activity: Not on file   Other Topics Concern    Not on file   Social History Narrative    Not on file     Social Determinants of Health     Financial Resource Strain: Low Risk     Difficulty of Paying Living Expenses: Not hard at all   Food Insecurity: No Food Insecurity    Worried About Running Out of Food in the Last Year: Never true    Luc of Food in the Last Year: Never true   Transportation Needs: No Transportation Needs    Lack of Transportation (Medical): No    Lack of Transportation (Non-Medical):  No   Physical Activity:     Days of Exercise per Week:     Minutes of Exercise per Session:    Stress:     Feeling of Stress :    Social Connections:     Frequency of Communication with Friends and Family:     Frequency of Social Gatherings with Friends and Family:     Attends Scientologist Services:     Active Member of Clubs or Organizations:     Attends Club or Organization Meetings:     Marital Status:    Intimate Partner Violence:     Fear of Current or Ex-Partner:     Emotionally Abused:     Physically Abused:     Sexually Abused:        Current Outpatient Medications on File Prior to Visit   Medication Sig Dispense Refill    ibuprofen (ADVIL;MOTRIN) 800 MG tablet Take 1,000 mg by mouth       No current facility-administered medications on file prior to visit. Review of Systems   Constitutional: Negative. Negative for activity change, appetite change, chills, diaphoresis, fatigue, fever and unexpected weight change. HENT: Negative. Negative for congestion, dental problem, drooling, ear discharge, ear pain, facial swelling, hearing loss, mouth sores, nosebleeds, postnasal drip, rhinorrhea, sinus pressure, sinus pain, sneezing, sore throat, tinnitus, trouble swallowing and voice change. Eyes: Negative. Negative for photophobia, pain, discharge, redness, itching and visual disturbance. Respiratory: Negative. Negative for apnea, cough, choking, chest tightness, shortness of breath, wheezing and stridor. Cardiovascular: Negative. Negative for chest pain, palpitations and leg swelling. Gastrointestinal: Positive for abdominal distention, abdominal pain and diarrhea. Negative for anal bleeding, blood in stool, constipation, nausea, rectal pain and vomiting. Endocrine: Negative. Negative for cold intolerance, heat intolerance, polydipsia, polyphagia and polyuria. Genitourinary: Negative. Negative for decreased urine volume, difficulty urinating, discharge, dysuria, enuresis, flank pain, frequency, genital sores, hematuria, penile pain, penile swelling, scrotal swelling, testicular pain and urgency.    Musculoskeletal: Positive for arthralgias, back pain, gait problem, joint swelling and myalgias. Negative for neck pain and neck stiffness. Multiple back chest surgeries and is now complaining of very lower lower part of his back across. I believe he has pulled something in he thinks it might be true but he wants to get an an MRI first.   Skin: Negative. Negative for color change, pallor, rash and wound. Allergic/Immunologic: Negative. Negative for environmental allergies, food allergies and immunocompromised state. Neurological: Negative for dizziness, tremors, seizures, syncope, facial asymmetry, speech difficulty, weakness, light-headedness, numbness and headaches. Hematological: Negative. Negative for adenopathy. Does not bruise/bleed easily. Psychiatric/Behavioral: Positive for sleep disturbance. Negative for agitation, behavioral problems, confusion, decreased concentration, dysphoric mood, hallucinations, self-injury and suicidal ideas. The patient is nervous/anxious. The patient is not hyperactive. Depression treated with Zoloft       Objective:     Physical Exam  Vitals reviewed. Constitutional:       Appearance: Normal appearance. HENT:      Head: Normocephalic and atraumatic. Right Ear: External ear normal. There is no impacted cerumen. Left Ear: External ear normal. There is no impacted cerumen. Nose: No congestion. Mouth/Throat:      Mouth: Mucous membranes are moist.      Pharynx: Oropharynx is clear. No oropharyngeal exudate or posterior oropharyngeal erythema. Eyes:      Extraocular Movements: Extraocular movements intact. Conjunctiva/sclera: Conjunctivae normal.      Pupils: Pupils are equal, round, and reactive to light. Cardiovascular:      Rate and Rhythm: Normal rate and regular rhythm. Pulses: Normal pulses. Heart sounds: Normal heart sounds. No murmur heard. No friction rub. No gallop. Pulmonary:      Effort: Pulmonary effort is normal. No respiratory distress.       Breath sounds: Normal breath sounds. No stridor. No wheezing, rhonchi or rales. Chest:      Chest wall: No tenderness. Abdominal:      General: Abdomen is flat. Bowel sounds are normal. There is no distension. Palpations: There is no mass. Tenderness: There is no abdominal tenderness. There is no right CVA tenderness, left CVA tenderness, guarding or rebound. Hernia: No hernia is present. Musculoskeletal:         General: Swelling, tenderness and signs of injury present. No deformity. Cervical back: Normal range of motion and neck supple. No rigidity or tenderness. Right lower leg: No edema. Left lower leg: Edema present. Skin:     Coloration: Skin is not jaundiced or pale. Findings: No bruising, erythema, lesion or rash. Neurological:      General: No focal deficit present. Mental Status: He is alert and oriented to person, place, and time. Cranial Nerves: No cranial nerve deficit. Sensory: No sensory deficit. Motor: No weakness. Coordination: Coordination normal.      Gait: Gait normal.      Deep Tendon Reflexes: Reflexes normal.   Psychiatric:         Mood and Affect: Mood normal.         Behavior: Behavior normal.         Thought Content: Thought content normal.         Judgment: Judgment normal.         Assessment:     1. Acute maxillary sinusitis, recurrence not specified  Flexeril as needed    2. Essential hypertension  Patient goes over 150/100 for consistent period of time like 3 days to weeks. I want to know because of like coming out and find out what the problem is. And now bring current messages with me to he is  Like him    3. Nausea  Very cold very hot foods to better    4.  Hypertension, unspecified type  Well-controlled with Zestoretic        Plan:     As above  We will also go back to the ED visit pressures over 160/100

## 2021-08-25 ASSESSMENT — ENCOUNTER SYMPTOMS
COLOR CHANGE: 0
EYE DISCHARGE: 0
ALLERGIC/IMMUNOLOGIC NEGATIVE: 1
COUGH: 0
PHOTOPHOBIA: 0
EYE PAIN: 0
EYE ITCHING: 0
STRIDOR: 0
CHEST TIGHTNESS: 0
EYES NEGATIVE: 1
SINUS PRESSURE: 0
BACK PAIN: 1
SORE THROAT: 0
VOMITING: 0
VOICE CHANGE: 0
DIARRHEA: 1
WHEEZING: 0
FACIAL SWELLING: 0
CHOKING: 0
TROUBLE SWALLOWING: 0
RECTAL PAIN: 0
ABDOMINAL DISTENTION: 1
ABDOMINAL PAIN: 1
ANAL BLEEDING: 0
RESPIRATORY NEGATIVE: 1
BLOOD IN STOOL: 0
CONSTIPATION: 0
RHINORRHEA: 0
SHORTNESS OF BREATH: 0
EYE REDNESS: 0
SINUS PAIN: 0
NAUSEA: 0
APNEA: 0

## 2021-09-20 ENCOUNTER — TELEPHONE (OUTPATIENT)
Dept: FAMILY MEDICINE CLINIC | Age: 46
End: 2021-09-20

## 2021-09-20 NOTE — TELEPHONE ENCOUNTER
Patient diagnosed with COVID on 9/17/21. Symptoms:  Chills, fatigue, body aches, cough, sore throat, poor appetite    What should he be doing at home while recovering? ?

## 2021-11-08 RX ORDER — CYCLOBENZAPRINE HCL 10 MG
10 TABLET ORAL 3 TIMES DAILY PRN
COMMUNITY
End: 2022-07-15 | Stop reason: ALTCHOICE

## 2021-11-08 RX ORDER — HYDROCODONE BITARTRATE AND ACETAMINOPHEN 5; 325 MG/1; MG/1
1 TABLET ORAL EVERY 6 HOURS PRN
COMMUNITY
End: 2022-07-15 | Stop reason: ALTCHOICE

## 2021-11-08 RX ORDER — HYDROCODONE BITARTRATE AND ACETAMINOPHEN 5; 300 MG/1; MG/1
TABLET ORAL
COMMUNITY
End: 2021-11-08 | Stop reason: ALTCHOICE

## 2021-11-09 ENCOUNTER — TELEPHONE (OUTPATIENT)
Dept: FAMILY MEDICINE CLINIC | Age: 46
End: 2021-11-09

## 2021-11-09 ENCOUNTER — VIRTUAL VISIT (OUTPATIENT)
Dept: FAMILY MEDICINE CLINIC | Age: 46
End: 2021-11-09
Payer: OTHER GOVERNMENT

## 2021-11-09 DIAGNOSIS — Z12.11 SCREEN FOR COLON CANCER: ICD-10-CM

## 2021-11-09 DIAGNOSIS — S89.91XD: ICD-10-CM
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
## 2021-11-09 DIAGNOSIS — S20.219D CONTUSION OF CHEST WALL, UNSPECIFIED LATERALITY, SUBSEQUENT ENCOUNTER: ICD-10-CM

## 2021-11-09 PROCEDURE — 99213 OFFICE O/P EST LOW 20 MIN: CPT | Performed by: FAMILY MEDICINE

## 2021-11-09 NOTE — TELEPHONE ENCOUNTER
----- Message from Sunitha Aparicio DO sent at 11/9/2021 10:07 AM EST -----  Please write a note for work. He is to be off from November 8, 2021 to November 15, 2021.

## 2021-11-09 NOTE — LETTER
[address] [facility] [address]  Phone: [phone]  Fax: [phone]    Zahida Frias DO        November 9, 2021     Patient: Harriet Cardenas   YOB: 1975   Date of Visit: 11/9/2021       To Whom it May Concern:    Devon Alyssagabriel was seen in my clinic on 11/9/2021. He should remain off work from 11/8/21-11/15/21. If you have any questions or concerns, please don't hesitate to call.     Sincerely,         Zahida Frias DO
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
## 2021-11-10 ENCOUNTER — TELEPHONE (OUTPATIENT)
Dept: FAMILY MEDICINE CLINIC | Age: 46
End: 2021-11-10
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
## 2022-07-13 NOTE — PROGRESS NOTES
07/15/22      Con Fernandez  Chief Complaint   Patient presents with    Migraine     Started about 6 months ago     Headache    Back Pain     Discuss muscle relaxer           Diagnosis Orders   1. Other migraine without status migrainosus, not intractable  SUMAtriptan (IMITREX) 50 MG tablet    CT HEAD W CONTRAST    Basic Metabolic Panel      2. DDD (degenerative disc disease), lumbar  cyclobenzaprine (FLEXERIL) 10 MG tablet          Keily Del Cid was seen today for migraine, headache and back pain. Diagnoses and all orders for this visit:    DDD (degenerative disc disease), lumbar, chronic, s/p surgery  -     cyclobenzaprine (FLEXERIL) 10 MG tablet; Take 1 tablet by mouth 3 times daily as needed for Muscle spasms    Other migraine without status migrainosus, not intractable, new onset in past 6 months  -     SUMAtriptan (IMITREX) 50 MG tablet; Take 1 tablet by mouth once as needed for Migraine  -     CT HEAD W CONTRAST; Future  -     Basic Metabolic Panel; Future      HPI  Here for migraines, new  For 6 months, different than his usual headaches  Creeps is how it starts, worse at night, mostly at night. Had one Sunday and it started at 3am, wakes from sleep. He assocs it since having covid  Gets HA about 2-3 times per week. Equilibrium is off since covid. Ice pack has helped. Now more often the headache occur. Starts on right side of head. No fam hx of migrains. Takes ibuprofen.     Past Medical History:   Diagnosis Date    Lumbar stenosis     Respiratory arrest ([facility] [address] 2012    due to an jaw absess     Social History     Socioeconomic History    Marital status:      Spouse name: Not on file    Number of children: Not on file    Years of education: Not on file    Highest education level: Not on file   Occupational History    Not on file   Tobacco Use    Smoking status: Former    Smokeless tobacco: Current     Types: Chew   Vaping Use    Vaping Use: Never used   Substance and Sexual Activity Alcohol use: Yes     Alcohol/week: 0.0 standard drinks     Comment: social    Drug use: No    Sexual activity: Not on file   Other Topics Concern    Not on file   Social History Narrative    Not on file     Social Determinants of Health     Financial Resource Strain: Low Risk     Difficulty of Paying Living Expenses: Not hard at all   Food Insecurity: No Food Insecurity    Worried About Running Out of Food in the Last Year: Never true    [address] in the Last Year: Never true   Transportation Needs: No Transportation Needs    Lack of Transportation (Medical): No    Lack of Transportation (Non-Medical): No   Physical Activity: Not on file   Stress: Not on file   Social Connections: Not on file   Intimate Partner Violence: Not on file   Housing Stability: Unknown    Unable to Pay for Housing in the Last Year: No    Number of Places Lived in the Last Year: Not on file    Unstable Housing in the Last Year: No           Current Outpatient Medications   Medication Sig Dispense Refill    cyclobenzaprine (FLEXERIL) 10 MG tablet Take 1 tablet by mouth 3 times daily as needed for Muscle spasms 30 tablet 2    SUMAtriptan (IMITREX) 50 MG tablet Take 1 tablet by mouth once as needed for Migraine 9 tablet 5    ibuprofen (ADVIL;MOTRIN) 800 MG tablet Take 1,000 mg by mouth       No current facility-administered medications for this visit. Vitals:    07/15/22 1038   BP: 108/86   Pulse: 76   SpO2: 96%     BP Readings from Last 5 Encounters:   07/15/22 108/86   08/24/21 120/86   03/26/21 128/86   12/11/18 123/82   11/27/18 130/88       ROS:  No fever or chills  No unexpected wt loss  No headaches  No chest pain  No shortness of breath  No nausea, vomiting or diarrhea  No loss of coordination. Physical Exam  Well developed well nourished patient   in no acute distress. Alert and oriented to person, place, and time. HEENT:Normocephalic, atraumatic. No scleral icterus. Pupils normal  Heart: Regular Rate and Rhythm. Respirations: lungs clear to auscultation bilaterally. Abdomin: Bowel sounds present. Extremities: No peripheral edema. No erythema. Time spent today included for this patient visit includes time spent preparing to see the patient  Including review of tests, labs and imaging,   revewing previous history and recent encounters,   obtaining and/or reviewing separately obtained history in care everywhere,  performing a medically appropriate examination and/or evaluation;   counseling and educating the patient and /family/caregiver when present,  ordering medications, tests, or procedures;   referring to other health care specialists;   documenting clinical information in the electronic health record;   independently interpreting results (not separately reported)   and communicating results to the patient. An electronic signature was used to authenticate this note. This was dictated. Errors mightbe possible due to dictation.   --Vi Becker,

## 2022-07-15 ENCOUNTER — OFFICE VISIT (OUTPATIENT)
Dept: FAMILY MEDICINE CLINIC | Age: 47
End: 2022-07-15
Payer: OTHER GOVERNMENT

## 2022-07-15 ENCOUNTER — TELEPHONE (OUTPATIENT)
Dept: FAMILY MEDICINE CLINIC | Age: 47
End: 2022-07-15

## 2022-07-15 VITALS
BODY MASS INDEX: 35.14 KG/M2 | SYSTOLIC BLOOD PRESSURE: 108 MMHG | DIASTOLIC BLOOD PRESSURE: 86 MMHG | HEART RATE: 76 BPM | HEIGHT: 71 IN | OXYGEN SATURATION: 96 % | WEIGHT: 251 LBS

## 2022-07-15 DIAGNOSIS — G43.809 OTHER MIGRAINE WITHOUT STATUS MIGRAINOSUS, NOT INTRACTABLE: Primary | ICD-10-CM

## 2022-07-15 DIAGNOSIS — M51.36 DDD (DEGENERATIVE DISC DISEASE), LUMBAR: ICD-10-CM

## 2022-07-15 PROCEDURE — 99214 OFFICE O/P EST MOD 30 MIN: CPT | Performed by: FAMILY MEDICINE

## 2022-07-15 RX ORDER — SUMATRIPTAN 50 MG/1
50 TABLET, FILM COATED ORAL
Qty: 9 TABLET | Refills: 5 | Status: SHIPPED | OUTPATIENT
Start: 2022-07-15 | End: 2022-09-29

## 2022-07-15 RX ORDER — CYCLOBENZAPRINE HCL 10 MG
10 TABLET ORAL 3 TIMES DAILY PRN
Qty: 30 TABLET | Refills: 2 | Status: SHIPPED | OUTPATIENT
Start: 2022-07-15

## 2022-07-15 ASSESSMENT — PATIENT HEALTH QUESTIONNAIRE - PHQ9
SUM OF ALL RESPONSES TO PHQ QUESTIONS 1-9: 0
2. FEELING DOWN, DEPRESSED OR HOPELESS: 0
SUM OF ALL RESPONSES TO PHQ QUESTIONS 1-9: 0
1. LITTLE INTEREST OR PLEASURE IN DOING THINGS: 0
SUM OF ALL RESPONSES TO PHQ QUESTIONS 1-9: 0
SUM OF ALL RESPONSES TO PHQ QUESTIONS 1-9: 0
SUM OF ALL RESPONSES TO PHQ9 QUESTIONS 1 & 2: 0

## 2022-07-15 NOTE — TELEPHONE ENCOUNTER
[facility] with [facility] imaging called to let us know that it is their protocol that if they have an order for CT Head with contrast they need to do one without contrast first. So the order should be CT Head with and without contrast. Call her with any ques.

## 2022-07-20 ENCOUNTER — HOSPITAL ENCOUNTER (OUTPATIENT)
Dept: CT IMAGING | Age: 47
Discharge: HOME | End: 2022-07-20
Payer: OTHER GOVERNMENT

## 2022-07-20 DIAGNOSIS — G43.809 OTHER MIGRAINE WITHOUT STATUS MIGRAINOSUS, NOT INTRACTABLE: ICD-10-CM

## 2022-07-20 PROCEDURE — 70470 CT HEAD/BRAIN W/O & W/DYE: CPT

## 2022-07-20 PROCEDURE — 6360000004 HC RX CONTRAST MEDICATION: Performed by: FAMILY MEDICINE

## 2022-07-20 RX ADMIN — IOPAMIDOL 75 ML: 755 INJECTION, SOLUTION INTRAVENOUS at 13:41

## 2022-07-21 DIAGNOSIS — G43.809 OTHER MIGRAINE WITHOUT STATUS MIGRAINOSUS, NOT INTRACTABLE: ICD-10-CM

## 2022-07-21 LAB
ANION GAP SERPL CALCULATED.3IONS-SCNC: 14 MMOL/L (ref 3–16)
BUN BLDV-MCNC: 13 MG/DL (ref 7–20)
CALCIUM SERPL-MCNC: 8.7 MG/DL (ref 8.3–10.6)
CHLORIDE BLD-SCNC: 105 MMOL/L (ref 99–110)
CO2: 23 MMOL/L (ref 21–32)
CREAT SERPL-MCNC: 0.9 MG/DL (ref 0.9–1.3)
GFR AFRICAN AMERICAN: >60
GFR NON-AFRICAN AMERICAN: >60
GLUCOSE BLD-MCNC: 89 MG/DL (ref 70–99)
POTASSIUM SERPL-SCNC: 4.2 MMOL/L (ref 3.5–5.1)
SODIUM BLD-SCNC: 142 MMOL/L (ref 136–145)

## 2022-09-29 ENCOUNTER — OFFICE VISIT (OUTPATIENT)
Dept: FAMILY MEDICINE CLINIC | Age: 47
End: 2022-09-29
Payer: OTHER GOVERNMENT

## 2022-09-29 VITALS
SYSTOLIC BLOOD PRESSURE: 122 MMHG | HEART RATE: 75 BPM | RESPIRATION RATE: 16 BRPM | WEIGHT: 256 LBS | OXYGEN SATURATION: 98 % | DIASTOLIC BLOOD PRESSURE: 82 MMHG | BODY MASS INDEX: 35.7 KG/M2

## 2022-09-29 DIAGNOSIS — Z00.00 ANNUAL PHYSICAL EXAM: ICD-10-CM

## 2022-09-29 DIAGNOSIS — M25.571 CHRONIC PAIN OF RIGHT ANKLE: ICD-10-CM

## 2022-09-29 DIAGNOSIS — G89.29 CHRONIC RIGHT SHOULDER PAIN: ICD-10-CM

## 2022-09-29 DIAGNOSIS — G89.29 CHRONIC PAIN OF RIGHT ANKLE: ICD-10-CM

## 2022-09-29 DIAGNOSIS — Z12.11 SCREEN FOR COLON CANCER: ICD-10-CM

## 2022-09-29 DIAGNOSIS — M25.511 CHRONIC RIGHT SHOULDER PAIN: ICD-10-CM

## 2022-09-29 DIAGNOSIS — Z00.00 ANNUAL PHYSICAL EXAM: Primary | ICD-10-CM

## 2022-09-29 DIAGNOSIS — M25.521 RIGHT ELBOW PAIN: ICD-10-CM

## 2022-09-29 LAB
CHOLESTEROL, TOTAL: 200 MG/DL (ref 0–199)
HDLC SERPL-MCNC: 49 MG/DL (ref 40–60)
LDL CHOLESTEROL CALCULATED: 129 MG/DL
TRIGL SERPL-MCNC: 110 MG/DL (ref 0–150)
VLDLC SERPL CALC-MCNC: 22 MG/DL

## 2022-09-29 PROCEDURE — 90715 TDAP VACCINE 7 YRS/> IM: CPT | Performed by: NURSE PRACTITIONER

## 2022-09-29 PROCEDURE — 90471 IMMUNIZATION ADMIN: CPT | Performed by: NURSE PRACTITIONER

## 2022-09-29 PROCEDURE — 99396 PREV VISIT EST AGE 40-64: CPT | Performed by: NURSE PRACTITIONER

## 2022-09-29 SDOH — ECONOMIC STABILITY: FOOD INSECURITY: WITHIN THE PAST 12 MONTHS, THE FOOD YOU BOUGHT JUST DIDN'T LAST AND YOU DIDN'T HAVE MONEY TO GET MORE.: NEVER TRUE

## 2022-09-29 SDOH — ECONOMIC STABILITY: FOOD INSECURITY: WITHIN THE PAST 12 MONTHS, YOU WORRIED THAT YOUR FOOD WOULD RUN OUT BEFORE YOU GOT MONEY TO BUY MORE.: NEVER TRUE

## 2022-09-29 ASSESSMENT — ENCOUNTER SYMPTOMS
SHORTNESS OF BREATH: 0
TROUBLE SWALLOWING: 0
ABDOMINAL PAIN: 0
NAUSEA: 0
WHEEZING: 0
CONSTIPATION: 0
SORE THROAT: 0
CHEST TIGHTNESS: 0
COLOR CHANGE: 0
EYE REDNESS: 0
DIARRHEA: 0
EYE ITCHING: 0
COUGH: 0
SINUS PRESSURE: 0

## 2022-09-29 ASSESSMENT — SOCIAL DETERMINANTS OF HEALTH (SDOH): HOW HARD IS IT FOR YOU TO PAY FOR THE VERY BASICS LIKE FOOD, HOUSING, MEDICAL CARE, AND HEATING?: NOT HARD AT ALL

## 2022-09-29 NOTE — PROGRESS NOTES
Alan Del Valle  YOB: 1975    Alan Del Valle    Date of Service:  9/29/2022    Chief Complaint:   Alan Del Valle is a 55 y.o. male who presents for complete physical examination. HPI: Here for annal physical. He needs referral for chronic joint issues. He has chronic right shoulder/arm pain, and right ankle pain. He was told he needed surgery several years ago, but chose to wait and see if things would improve. They have not. Wt Readings from Last 3 Encounters:   09/29/22 256 lb (116.1 kg)   07/15/22 251 lb (113.9 kg)   08/24/21 236 lb (107 kg)       BP Readings from Last 3 Encounters:   09/29/22 122/82   07/15/22 108/86   08/24/21 120/86       Vitals:    09/29/22 0949   BP: 122/82   Site: Left Upper Arm   Position: Sitting   Pulse: 75   Resp: 16   SpO2: 98%   Weight: 256 lb (116.1 kg)       Patient Active Problem List   Diagnosis    Status post lumbar surgery    DDD (degenerative disc disease), lumbar    Ankle instability, right    Peroneal tendon injury, right, initial encounter    Right wrist pain    Right wrist tendinitis    TFC (triangular fibrocartilage complex) injury, right, initial encounter       Preventive Care:  Health Maintenance   Topic Date Due    COVID-19 Vaccine (1) Never done    HIV screen  Never done    Hepatitis C screen  Never done    DTaP/Tdap/Td vaccine (1 - Tdap) Never done    Lipids  Never done    Colorectal Cancer Screen  Never done    Flu vaccine (1) Never done    Depression Screen  07/15/2023    Hepatitis A vaccine  Aged Out    Hepatitis B vaccine  Aged Out    Hib vaccine  Aged Out    Meningococcal (ACWY) vaccine  Aged Out    Pneumococcal 0-64 years Vaccine  Aged Out      Last eye exam: 2020, normal  Exercise: no regular exercise  Seatbelt use: yes  Lipid panel: No results found for: CHOL, TRIG, HDL, LDLCALC, LDLDIRECT   Screenings due: colonscopy       There is no immunization history on file for this patient.     No Known Allergies    Current Outpatient Medications   Medication Sig Dispense Refill    cyclobenzaprine (FLEXERIL) 10 MG tablet Take 1 tablet by mouth 3 times daily as needed for Muscle spasms 30 tablet 2    SUMAtriptan (IMITREX) 50 MG tablet Take 1 tablet by mouth once as needed for Migraine 9 tablet 5    ibuprofen (ADVIL;MOTRIN) 800 MG tablet Take 1,000 mg by mouth       No current facility-administered medications for this visit. Past Medical History:   Diagnosis Date    Chronic back pain     I've had multiple back surgeries    Headache Aug 2021    Started getting mild headaches after i had covid    Lumbar stenosis     Respiratory arrest ([facility] [address] 2012    due to an jaw absess       Past Surgical History:   Procedure Laterality Date    ANKLE SURGERY Right     [address]  [id number]    RIGHT L4-5, L5-S1 TRANSFORMINAL LUMBAR INTERBODY FUSION WITH       Family History   Problem Relation Age of Onset    Other Neg Hx        Social History     Socioeconomic History    Marital status:      Spouse name: Not on file    Number of children: Not on file    Years of education: Not on file    Highest education level: Not on file   Occupational History    Not on file   Tobacco Use    Smoking status: Former     Packs/day: 2.00     Years: 15.00     Pack years: 30.00     Types: Cigarettes     Start date: 1995     Quit date: 2011     Years since quittin.0    Smokeless tobacco: Current     Types: Chew   Vaping Use    Vaping Use: Never used   Substance and Sexual Activity    Alcohol use:  Yes     Alcohol/week: 8.0 - 14.0 standard drinks     Types: 2 Glasses of wine, 6 - 12 Cans of beer per week     Comment: social    Drug use: No    Sexual activity: Yes     Partners: Female   Other Topics Concern    Not on file   Social History Narrative    Not on file     Social Determinants of Health     Financial Resource Strain: Low Risk     Difficulty of Paying Living Expenses: Not hard at all   Food Insecurity: No Food Insecurity    Worried About Running Out of Food in the Last Year: Never true    Ran Out of Food in the Last Year: Never true   Transportation Needs: Not on file   Physical Activity: Not on file   Stress: Not on file   Social Connections: Not on file   Intimate Partner Violence: Not on file   Housing Stability: Not on file       Review of Systems:  Review of Systems   Constitutional:  Negative for activity change, chills, fatigue, fever and unexpected weight change. HENT:  Negative for ear discharge, mouth sores, postnasal drip, sinus pressure, sore throat and trouble swallowing. Eyes:  Negative for redness, itching and visual disturbance. Respiratory:  Negative for cough, chest tightness, shortness of breath and wheezing. Snores, wakes up some times with headaches, frequent dry mouth. No sleep apnea evaluation   Cardiovascular:  Negative for chest pain, palpitations and leg swelling. Gastrointestinal:  Negative for abdominal pain, constipation, diarrhea and nausea. Endocrine: Negative for cold intolerance, heat intolerance, polydipsia, polyphagia and polyuria. Genitourinary:  Negative for dysuria, frequency and urgency. Musculoskeletal:  Positive for arthralgias and gait problem. Negative for joint swelling and myalgias. Skin:  Negative for color change, pallor and rash. Allergic/Immunologic: Negative for environmental allergies, food allergies and immunocompromised state. Neurological:  Positive for headaches (chronic). Negative for dizziness, syncope and weakness. Hematological:  Does not bruise/bleed easily. Psychiatric/Behavioral:  Negative for behavioral problems, hallucinations and sleep disturbance. The patient is not nervous/anxious. Physical Exam:     Body mass index is 35.7 kg/m². Physical Exam  Vitals and nursing note reviewed. Constitutional:       General: He is not in acute distress. Appearance: He is well-developed. He is obese.  He is not ill-appearing or diaphoretic. HENT:      Head: Normocephalic and atraumatic. Right Ear: Tympanic membrane, ear canal and external ear normal.      Left Ear: Tympanic membrane, ear canal and external ear normal.      Nose: Nose normal.      Mouth/Throat:      Pharynx: No oropharyngeal exudate. Comments: Narrow palate  Eyes:      General:         Right eye: No discharge. Left eye: No discharge. Conjunctiva/sclera: Conjunctivae normal.   Cardiovascular:      Rate and Rhythm: Normal rate and regular rhythm. Heart sounds: Normal heart sounds. No murmur heard. No friction rub. No gallop. Pulmonary:      Effort: Pulmonary effort is normal. No respiratory distress. Breath sounds: Normal breath sounds. No wheezing or rales. Abdominal:      General: Bowel sounds are normal. There is no distension. Palpations: Abdomen is soft. Tenderness: There is no abdominal tenderness. Musculoskeletal:         General: No tenderness. Normal range of motion. Cervical back: Normal range of motion and neck supple. Lymphadenopathy:      Cervical: No cervical adenopathy. Skin:     General: Skin is warm and dry. Coloration: Skin is not pale. Findings: No erythema or rash. Neurological:      Mental Status: He is alert and oriented to person, place, and time. Motor: No abnormal muscle tone. Coordination: Coordination normal.   Psychiatric:         Behavior: Behavior normal.         Thought Content: Thought content normal.         Judgment: Judgment normal.       Assessment      ICD-10-CM    1. Annual physical exam  Z00.00 LIPID PANEL      2. Chronic right shoulder pain  R01.527 Kathleen Pollard MD, Orthopedic Surgery (Shoulder; Hip; Knee), [place]    G89.29       3. Chronic pain of right ankle  M25.571 Kathleen Montalvo MD, Orthopedic Surgery (Foot, Ankle), [facility]    G89.29       4.  Right elbow pain  J09.188 Jerica Rust MD, Orthopedic Surgery (Shoulder; Hip; Knee), [facility]      5. Screen for colon cancer  Z12.11 MOE - Roger Gleason DO, Gastroenterology, [facility]          Plan    Discussed diet and exercise  New referrals to ortho placed  Aspercreme to elbow  Tdap  Labs today  Discussed sleep apnea referral- pt declines at this time  Colon cancer screening discussed- referral placed- not a candidate for cologuard- father + colon cancer and genetic trait for colon cancer    No orders of the defined types were placed in this encounter. Orders Placed This Encounter   Procedures    Tdap, ADACEL, (age 10y-63y), IM    LIPID PANEL     Standing Status:   Future     Standing Expiration Date:   9/29/2023    Tommy Walker MD, Orthopedic Surgery (Foot, Ankle), [facility]     Referral Priority:   Routine     Referral Type:   Eval and Treat     Referral Reason:   Specialty Services Required     Referred to Provider:   Fay Conroy MD     Requested Specialty:   Orthopedic Surgery     Number of Visits Requested:   [place]  Jon Jackson MD, Orthopedic Surgery (Shoulder; Hip; Knee), [facility]     Referral Priority:   Routine     Referral Type:   Eval and Treat     Referral Reason:   Specialty Services Required     Referred to Provider:   Gregary Moritz, MD     Requested Specialty:   Orthopedic Surgery     Number of Visits Requested:   1    MOE Miranda DO, Gastroenterology, [facility]     Referral Priority:   Routine     Referral Type:   Eval and Treat     Referral Reason:   Specialty Services Required     Referred to Provider:   Eris Gardner DO     Requested Specialty:   Gastroenterology     Number of Visits Requested:   1         Patient Education:    Juani Meng on importance of healthy diet and regular exercise of at least 30 minutes on four or more days during the week. Counseled on skin safety, SPF 27 or higher prior to going outdoors and reapplication every twohours while outside.  Monitor moles for changes, report to provider if greater than 6 mm, color variations, asymmetry, redness, scales, and/or overlying skin changes  Counseled on safety, wear seatbelt, do not consumealcohol and drive or drive with anyone who has consumed alcohol    Follow Up  1 year or as needed

## 2022-09-30 NOTE — RESULT ENCOUNTER NOTE
Your cholesterol is a little high. I would like you to watch your diet and try to increase your exercise.  I recommend rechecking in 3-6 months

## 2022-10-11 ENCOUNTER — OFFICE VISIT (OUTPATIENT)
Dept: ORTHOPEDIC SURGERY | Age: 47
End: 2022-10-11
Payer: OTHER GOVERNMENT

## 2022-10-11 VITALS — WEIGHT: 256 LBS | BODY MASS INDEX: 35.84 KG/M2 | HEIGHT: 71 IN

## 2022-10-11 DIAGNOSIS — S43.431S GLENOID LABRUM TEAR, RIGHT, SEQUELA: Primary | ICD-10-CM

## 2022-10-11 DIAGNOSIS — M25.371 INSTABILITY OF RIGHT ANKLE JOINT: ICD-10-CM

## 2022-10-11 DIAGNOSIS — M25.571 CHRONIC PAIN OF RIGHT ANKLE: ICD-10-CM

## 2022-10-11 DIAGNOSIS — G89.29 CHRONIC PAIN OF RIGHT ANKLE: ICD-10-CM

## 2022-10-11 PROCEDURE — L1902 AFO ANKLE GAUNTLET PRE OTS: HCPCS | Performed by: ORTHOPAEDIC SURGERY

## 2022-10-11 PROCEDURE — 99244 OFF/OP CNSLTJ NEW/EST MOD 40: CPT | Performed by: ORTHOPAEDIC SURGERY

## 2022-10-11 SDOH — HEALTH STABILITY: PHYSICAL HEALTH
ON AVERAGE, HOW MANY DAYS PER WEEK DO YOU ENGAGE IN MODERATE TO STRENUOUS EXERCISE (LIKE A BRISK WALK)?: PATIENT DECLINED

## 2022-10-11 ASSESSMENT — SOCIAL DETERMINANTS OF HEALTH (SDOH)
WITHIN THE LAST YEAR, HAVE TO BEEN RAPED OR FORCED TO HAVE ANY KIND OF SEXUAL ACTIVITY BY YOUR PARTNER OR EX-PARTNER?: NO
WITHIN THE LAST YEAR, HAVE YOU BEEN KICKED, HIT, SLAPPED, OR OTHERWISE PHYSICALLY HURT BY YOUR PARTNER OR EX-PARTNER?: NO
WITHIN THE LAST YEAR, HAVE YOU BEEN AFRAID OF YOUR PARTNER OR EX-PARTNER?: NO
WITHIN THE LAST YEAR, HAVE YOU BEEN HUMILIATED OR EMOTIONALLY ABUSED IN OTHER WAYS BY YOUR PARTNER OR EX-PARTNER?: NO

## 2022-10-11 NOTE — PROGRESS NOTES
[address] [facility]  Outpatient Progress Note  Melody Munoz MD    Patient Name: Kareem Christianson MRN: [id number]   Age: 55 y.o. YOB: 1975   Sex: male      [address] Complaint   Patient presents with    New Patient     Right ankle Pain         HISTORY OF PRESENT ILLNESS   Kareem Christianson is a 55 y.o. male presents the office today at the request of Ralf Wyatt CNP for 3 consultation regarding right ankle pain. Patient states that he originally injured his ankle playing soccer multiple times throughout high school and while in the [employer]. He had ankle arthroscopy in 1998 and states that this helped him quite a bit. In 2018 he developed pain and swelling in the right ankle without traumatic incident. He saw Dr. Jennifer Yuen and had an MRI scan of the ankle showing a chronic ATFL tear and ankle and subtalar joint effusions. He was treated with ankle brace and physical therapy and he improved. He has managed over the last 4 years with intermittent ice, anti-inflammatory medications, bracing and home exercise program.  He states he has pain multiple times throughout the week. He has not had a episode of instability or rolling over in a year or more. He states the pain does not limit his ability to work.   Came in today for further evaluation and treatment recommendations           PAST MEDICAL HISTORY      Past Medical History:   Diagnosis Date    Chronic back pain 2005    I've had multiple back surgeries    Headache Aug 2021    Started getting mild headaches after i had covid    Lumbar stenosis     Respiratory arrest ([facility] [address] 01/01/2012    due to an jaw absess       PAST SURGICAL HISTORY     Past Surgical History:   Procedure Laterality Date    ANKLE SURGERY Right     [address]  [id number]    RIGHT L4-5, L5-S1 TRANSFORMINAL LUMBAR INTERBODY FUSION WITH       MEDICATIONS     Current Outpatient Medications   Medication Sig Dispense Refill cyclobenzaprine (FLEXERIL) 10 MG tablet Take 1 tablet by mouth 3 times daily as needed for Muscle spasms 30 tablet 2    SUMAtriptan (IMITREX) 50 MG tablet Take 1 tablet by mouth once as needed for Migraine 9 tablet 5    ibuprofen (ADVIL;MOTRIN) 800 MG tablet Take 1,000 mg by mouth       No current facility-administered medications for this visit. ALLERGIES   No Known Allergies    FAMILY HISTORY     Family History   Problem Relation Age of Onset    Other Neg Hx        SOCIAL HISTORY     Social History     Socioeconomic History    Marital status:      Spouse name: None    Number of children: None    Years of education: None    Highest education level: None   Tobacco Use    Smoking status: Former     Packs/day: 2.00     Years: 15.00     Pack years: 30.00     Types: Cigarettes     Start date: 1995     Quit date: 2011     Years since quittin.0    Smokeless tobacco: Current     Types: Chew   Vaping Use    Vaping Use: Never used   Substance and Sexual Activity    Alcohol use:  Yes     Alcohol/week: 8.0 - 14.0 standard drinks     Types: 2 Glasses of wine, 6 - 12 Cans of beer per week     Comment: social    Drug use: No    Sexual activity: Yes     Partners: Female     Social Determinants of Health     Financial Resource Strain: Low Risk     Difficulty of Paying Living Expenses: Not hard at all   Food Insecurity: No Food Insecurity    Worried About Running Out of Food in the Last Year: Never true    Ran Out of Food in the Last Year: Never true   Physical Activity: Unknown    Days of Exercise per Week: Patient refused   Intimate Partner Violence: Not At Risk    Fear of Current or Ex-Partner: No    Emotionally Abused: No    Physically Abused: No    Sexually Abused: No       REVIEW OF SYSTEMS   General: no fever, chills, night sweats, anorexia, malaise, fatigue, or weight change  Hematologic:  no unexplained bleeding or bruising  HEENT:   no nasal congestion, rhinorrhea, sore throat, or facial pain  Respiratory:  no cough, dyspnea, or chest pain  Cardiovascular:  no angina, LAWS, PND, orthopnea, dependent edema, or palpitations  Gastrointestinal:  no nausea, vomiting, diarrhea, constipation, or abdominal pain  Genitourinary:  no urinary urgency, frequency, dysuria, or hematuria  Musculoskeletal: see HPI  Endocrine:  no heat or cold intolerance and no polyphagia, polydipsia, or polyuria  Skin:  no skin eruptions or changing lesions  Neurologic:  no focal weakness, numbness/tingling, tremor, or severe headache. See HPI. See HPI for pertinent positives. PHYSICAL EXAM   Vital Signs:   Vitals:    10/11/22 1030   Weight: 256 lb (116.1 kg)   Height: 5' 11\" (1.803 m)       General appearance: healthy, alert, no distress  Skin: Skin color, texture, turgor normal. No rashes or lesions  HEENT: atraumatic, normocephalic. PERRL  Respiratory: Unlabored breathing  Lymphatic: No adenopathy   Neuro: Alert and oriented, normal distal sensation, normal bilateral DTRs  Vascular: Normal distal capillary and distal pulses  Muskuloskeletal Exam: Right ankle examination mild nonspecific swelling. No definite effusion. No erythema or warmth. Nonspecific tenderness to palpation. Full range of motion. No significant laxity with anterior drawer or varus stress testing. No tenderness in the peroneal tendons. No tenderness over the deltoid ligament. Mild tenderness over the ATFL. Weightbearing alignment the ankle hindfoot midfoot and forefoot are normal.  He can balance on the right foot and do a single footed heel rise. His gait appears normal.    RADIOLOGY   X-rays obtained and reviewed in office:  Views lateral feet and ankle standing 3 views    Impression: Mild degenerative changes noted in the tibiotalar joint with evidence of age-indeterminate/chronic appearing avulsion fracture fragments of the medial malleolus and distal fibula. Anterior osteophyte at the distal tibia and talar neck.   No definite intra-articular loose body. MRI scan of the right ankle done in 2018  1. Age-indeterminate rupture/tear of the anterior talofibular ligament. Moderate debris containing tibiotalar joint effusion. Large posterior   subtalar joint effusion. 2. Age-indeterminate partial tearing of the deep portion of medial deltoid   ligament complex. 3. Very mild central cord plantar fasciitis. 4. Mild diffuse degenerative changes of the tarsometatarsal joints and   midfoot. 5. Mild tendinosis of peroneus longus and peroneus brevis. Low-grade partial   split tearing of peroneus brevis tendon. IMPRESSION     1. Glenoid labrum tear, right, sequela    2. Chronic pain of right ankle    3. Instability of right ankle joint         PLAN   I had a lengthy discussion with patient today regarding diagnosis and treatment options and recommendations. Though the patient has mild to moderate chronic pain in the ankle he is not suffering with clinical episodes of instability. I had a lengthy discussion with him today regarding nonsurgical and surgical treatment options for his current complaints. Without surgery I would recommend he continue with accommodative shoe wear, ankle bracing, ice, intermittent over-the-counter analgesic or anti-inflammatory medications and activity as tolerated. Should pain symptoms worsen or episodes of instability become worse then he would be a candidate for ankle arthroscopy, debridement and repair of the ATFL. He does prefer to continue with conservative measures at this time. Incidentally the patient complains of chronic right shoulder pain. He had an MRI scan in the past demonstrating complex glenoid labrum tear.   We will make referral to Dr. Lamonte Fisher for further evaluation and treatment recommendations    A handwritten plan of care was diagrammed in the office today and is scanned into the patient's chart in the media section as part of the permanent electronic medical record. FOLLOWUP     Return if symptoms worsen or fail to improve. Orders Placed This Encounter   Procedures    XR SHOULDER RIGHT (MIN 2 VIEWS)     Standing Status:   Future     Number of Occurrences:   1     Standing Expiration Date:   10/5/2023     Order Specific Question:   Reason for exam:     Answer:   pain    XR FOOT RIGHT (MIN 3 VIEWS)     Standing Status:   Future     Number of Occurrences:   1     Standing Expiration Date:   10/11/2023     Order Specific Question:   Reason for exam:     Answer:   PAIN    XR ANKLE RIGHT (MIN 3 VIEWS)     Standing Status:   Future     Number of Occurrences:   1     Standing Expiration Date:   10/11/2023     Order Specific Question:   Reason for exam:     Answer:   PAIN    [facility] Wraptor Ankle Brace     Patient was prescribed a [employer]o Ankle Brace. The right ankle will require stabilization / immobilization from this semi-rigid / rigid orthosis to improve their function. The orthosis will assist in protecting the affected area, provide functional support and facilitate healing. Patient was instructed to progress ambulation weight bearing as tolerated in the device. The patient was educated and fit by a healthcare professional with expert knowledge and specialization in brace application while under the direct supervision of the treating physician. Verbal and written instructions for the use of and application of this item were provided. They were instructed to contact the office immediately should the brace result in increased pain, decreased sensation, increased swelling or worsening of the condition. No orders of the defined types were placed in this encounter.       Patient was instructed on appropriate use of braces, participation in home exercise programs, healthy lifestyle choices and weight loss as appropriate     Kenna Burrell MD

## 2022-10-18 ENCOUNTER — OFFICE VISIT (OUTPATIENT)
Dept: ORTHOPEDIC SURGERY | Age: 47
End: 2022-10-18
Payer: OTHER GOVERNMENT

## 2022-10-18 VITALS — WEIGHT: 256 LBS | HEIGHT: 71 IN | BODY MASS INDEX: 35.84 KG/M2

## 2022-10-18 DIAGNOSIS — S43.431A GLENOID LABRUM TEAR, RIGHT, INITIAL ENCOUNTER: Primary | ICD-10-CM

## 2022-10-18 PROCEDURE — 99244 OFF/OP CNSLTJ NEW/EST MOD 40: CPT | Performed by: STUDENT IN AN ORGANIZED HEALTH CARE EDUCATION/TRAINING PROGRAM

## 2022-10-18 NOTE — LETTER
[address] [place] [address] 66 [id number]  Phone: [phone]  Fax: [phone]           Mago Moreno MD      October 18, 2022     Patient: Nidhi Poole   MR Number: [id number]   YOB: 1975   Date of Visit: 10/18/2022       Dear Dr. Don Mayo    Thank you for referring Yakelin Francisco to me for evaluation/treatment. Below are the relevant portions of my assessment and plan of care.          Sincerely,        Mago Moreno MD    CC providers:  MD Elva Rogers [phone]  [address] [facility] [address]

## 2022-10-18 NOTE — PROGRESS NOTES
Dr Lan Rater      Date /Time 10/18/2022       10:58 AM EDT  Name Alix Bauer             1975   Location  [facility]  MRN [id number]                Chief Complaint   Patient presents with    Shoulder Pain     Right shoulder pain, 5-7/10 pain   Torn labrum. Pain started about a year ago, hand/arm goes numb down side. Throbbing and burning behind/under shoulder blade. Anything overhead hurts, driving, riding motorcycle. History of Present Illness  Alix Bauer is a 55 y.o. male who presents with  right shoulder pain. This shoulder pain is chronic in nature he did have a MRI of the shoulder back in  which showed a complex labrum tear. He states he has not dislocated his shoulder or had any particular injury he does have this MRI because of generalized shoulder pain. He has never had surgery or an injection on the shoulder. He says he tried physical therapy remotely which did not provide significant relief. He notices the shoulder pain mostly when doing overhead activities. He describes symptoms of muscle tightness around the shoulder blade as well as tingling feeling that goes down into his little finger. No other weakness or numbness.           Sent in consultation by Dr. Alfredo Sheikh        Past History  Past Medical History:   Diagnosis Date    Chronic back pain     I've had multiple back surgeries    Headache Aug 2021    Started getting mild headaches after i had covid    Lumbar stenosis     Respiratory arrest ([facility] [address] 2012    due to an jaw absess     Past Surgical History:   Procedure Laterality Date    ANKLE SURGERY Right     [address]  [id number]    RIGHT L4-5, L5-S1 TRANSFORMINAL LUMBAR INTERBODY FUSION WITH     Social History     Tobacco Use    Smoking status: Former     Packs/day: 2.00     Years: 15.00     Pack years: 30.00     Types: Cigarettes     Start date: 1995     Quit date: 2011     Years since quittin.1 Smokeless tobacco: Current     Types: Chew   Substance Use Topics    Alcohol use: Yes     Alcohol/week: 8.0 - 14.0 standard drinks     Types: 2 Glasses of wine, 6 - 12 Cans of beer per week     Comment: social      Current Outpatient Medications on File Prior to Visit   Medication Sig Dispense Refill    cyclobenzaprine (FLEXERIL) 10 MG tablet Take 1 tablet by mouth 3 times daily as needed for Muscle spasms 30 tablet 2    SUMAtriptan (IMITREX) 50 MG tablet Take 1 tablet by mouth once as needed for Migraine 9 tablet 5    ibuprofen (ADVIL;MOTRIN) 800 MG tablet Take 1,000 mg by mouth       No current facility-administered medications on file prior to visit. Review of Systems  10-point ROS is negative other than HPI. Physical Exam  Based off 1997 Exam Criteria  Ht 5' 11\" (1.803 m)   Wt 256 lb (116.1 kg)   BMI 35.70 kg/m²      Constitutional:       General: He is not in acute distress. Appearance: Normal appearance. Cardiovascular:      Rate and Rhythm: Normal rate and regular rhythm. Pulses: Normal pulses. Pulmonary:      Effort: Pulmonary effort is normal. No respiratory distress. Neurological:      Mental Status: He is alert and oriented to person, place, and time. Mental status is at baseline. Gait:  normal    Right shoulder    No visible atrophy or skin changes  No TTP at [place], AC, Subacromial / cuff insertion  There is periscapular muscle tightness   without pain, ER 45 without pain, IR T12 without pain  Full strength to empty can, ER, IR  SILT ax/rad/med/ulnar  BCR distally    Special tests: No anterior apprehension     Imaging:       Radiographs: Multiple views right shoulder ordered obtained reviewed and interpreted show no significant glenohumeral degenerative changes minor AC joint degenerative changes. No other bony abnormalities.       Procedure:    Risks benefits limitations and alternatives to right shoulder GH injection were discussed with patient who wished to proceed. Under aseptic technique 4cc 1% lidocaine and 40mg kenalog were injected into the right shoulder without difficulty. There was no complications in the patient tolerated the procedure well. No orders of the defined types were placed in this encounter. Assessment and Plan  Amie Alavrez was seen today for shoulder pain. Diagnoses and all orders for this visit:    Glenoid labrum tear, right, initial encounter    He has a chronic labrum tear without clinical instability, never dislocated. No significant degenerative changes on XR and preserved function on exam. I believe his symptoms are related to this chronic labrum tear, micro instability and poor rotator cuff conditioning leading to periscapular muscle dysfunction. He was provided with exercises for rotator cuff strengthening at home, will use his bow flex and isometric bands. He is not interested in formal PT. He was also provided with a [address] [place] [address] Avenue steroid injection today. Follow up as needed. Total time spent reviewing past notes, imaging, labs, clinical exam, and treatment plan was > 45 min. Electronically signed by Riky Rice MD on 16/79/9892 at 11:29 AM  This dictation was generated by voice recognition computer software. Although all attempts are made to edit the dictation for accuracy, there may be errors in the transcription that are not intended.

## 2022-11-30 NOTE — PROGRESS NOTES
PAT completed with patient orders placed per MD, patient states his father Vi Humphreys will be his  and caregiver day of procedure. Any Thorpe RN

## 2022-11-30 NOTE — PROGRESS NOTES
..1.  Do not eat or drink anything after 12 midnight prior to surgery. This includes no water, chewing gum or mints, except for bowel prep complete per MD.  2.  Take the following pills with a small sip of water on the morning of surgery 12/01/2022.  3.  Aspirin, Ibuprofen, Advil, Naproxen, Vitamin E and other Anti-inflammatory products should be stopped for 5 days before surgery or as directed by your physician. 4.  Check with your doctor regarding stopping Plavix, Coumadin, Lovenox, Fragmin or other blood thinners. 5.  Do not smoke and do not drink alcoholic beverages 24 hours prior to surgery. This includes NA Beer. 6.  You may brush your teeth and gargle the morning of surgery. DO NOT SWALLOW WATER.  7.  You MUST make arrangements for a responsible adult to take you home after your surgery. You will not be allowed to leave alone or drive yourself home. It is strongly suggested someone stay with you the first 24 hours. Your surgery will be cancelled if you do not have a ride home. 8.  A parent/legal guardian must accompany a child scheduled for surgery and plan to stay at the hospital until the child is discharged. Please do not bring other children with you. 9.  Please wear simple, loose fitting clothing to the hospital.  Rhonda Casaser not bring valuables ( money, credit cards, checkbooks, etc.)  Do not wear any makeup (including no eye makeup) or nail polish on your fingers or toes. 10.  Do not wear any jewelry or piercing on the day of surgery. All body piercing jewelry must be removed. 11.  If you have dentures, they will be removed before going to the OR; we will provide you a container. If you wear contact lenses or glasses, they will be removed; please bring a case for them.   15.  Notify your Surgeon if you develop any illness between now and surgery time; cough, cold, fever, sore throat, nausea, vomiting, etc.  Please notify your surgeon if you experience dizziness, shortness of breath or blurred vision between now and the time of your surgery. To provide excellent care, visitors will be limited to two in a room at any given time. Please no children under the age of 15 in the surgical department.

## 2022-12-01 ENCOUNTER — HOSPITAL ENCOUNTER (OUTPATIENT)
Age: 47
Setting detail: OUTPATIENT SURGERY
Discharge: HOME | End: 2022-12-01
Attending: INTERNAL MEDICINE | Admitting: INTERNAL MEDICINE
Payer: OTHER GOVERNMENT

## 2022-12-01 ENCOUNTER — ANESTHESIA EVENT (OUTPATIENT)
Dept: ENDOSCOPY | Age: 47
End: 2022-12-01
Payer: OTHER GOVERNMENT

## 2022-12-01 ENCOUNTER — ANESTHESIA (OUTPATIENT)
Dept: ENDOSCOPY | Age: 47
End: 2022-12-01
Payer: OTHER GOVERNMENT

## 2022-12-01 VITALS
RESPIRATION RATE: 16 BRPM | SYSTOLIC BLOOD PRESSURE: 120 MMHG | WEIGHT: 244 LBS | HEART RATE: 64 BPM | TEMPERATURE: 97.4 F | OXYGEN SATURATION: 100 % | BODY MASS INDEX: 34.16 KG/M2 | HEIGHT: 71 IN | DIASTOLIC BLOOD PRESSURE: 75 MMHG

## 2022-12-01 DIAGNOSIS — Z12.11 SPECIAL SCREENING FOR MALIGNANT NEOPLASMS, COLON: ICD-10-CM

## 2022-12-01 PROCEDURE — 6360000002 HC RX W HCPCS: Performed by: NURSE ANESTHETIST, CERTIFIED REGISTERED

## 2022-12-01 PROCEDURE — 3609010600 HC COLONOSCOPY POLYPECTOMY SNARE/COLD BIOPSY: Performed by: INTERNAL MEDICINE

## 2022-12-01 PROCEDURE — 2500000003 HC RX 250 WO HCPCS: Performed by: NURSE ANESTHETIST, CERTIFIED REGISTERED

## 2022-12-01 PROCEDURE — 88305 TISSUE EXAM BY PATHOLOGIST: CPT

## 2022-12-01 PROCEDURE — 3700000000 HC ANESTHESIA ATTENDED CARE: Performed by: INTERNAL MEDICINE

## 2022-12-01 PROCEDURE — 3700000001 HC ADD 15 MINUTES (ANESTHESIA): Performed by: INTERNAL MEDICINE

## 2022-12-01 PROCEDURE — 7100000011 HC PHASE II RECOVERY - ADDTL 15 MIN: Performed by: INTERNAL MEDICINE

## 2022-12-01 PROCEDURE — 2580000003 HC RX 258: Performed by: NURSE ANESTHETIST, CERTIFIED REGISTERED

## 2022-12-01 PROCEDURE — 2709999900 HC NON-CHARGEABLE SUPPLY: Performed by: INTERNAL MEDICINE

## 2022-12-01 PROCEDURE — 7100000010 HC PHASE II RECOVERY - FIRST 15 MIN: Performed by: INTERNAL MEDICINE

## 2022-12-01 RX ORDER — DIPHENHYDRAMINE HYDROCHLORIDE 50 MG/ML
12.5 INJECTION INTRAMUSCULAR; INTRAVENOUS
Status: CANCELLED | OUTPATIENT
Start: 2022-12-01 | End: 2022-12-02

## 2022-12-01 RX ORDER — PROPOFOL 10 MG/ML
INJECTION, EMULSION INTRAVENOUS PRN
Status: DISCONTINUED | OUTPATIENT
Start: 2022-12-01 | End: 2022-12-01 | Stop reason: SDUPTHER

## 2022-12-01 RX ORDER — MEPERIDINE HYDROCHLORIDE 25 MG/ML
12.5 INJECTION INTRAMUSCULAR; INTRAVENOUS; SUBCUTANEOUS EVERY 5 MIN PRN
Status: CANCELLED | OUTPATIENT
Start: 2022-12-01

## 2022-12-01 RX ORDER — SODIUM CHLORIDE 9 MG/ML
25 INJECTION, SOLUTION INTRAVENOUS PRN
Status: CANCELLED | OUTPATIENT
Start: 2022-12-01

## 2022-12-01 RX ORDER — SODIUM CHLORIDE 0.9 % (FLUSH) 0.9 %
5-40 SYRINGE (ML) INJECTION EVERY 12 HOURS SCHEDULED
Status: CANCELLED | OUTPATIENT
Start: 2022-12-01

## 2022-12-01 RX ORDER — SODIUM CHLORIDE, SODIUM LACTATE, POTASSIUM CHLORIDE, CALCIUM CHLORIDE 600; 310; 30; 20 MG/100ML; MG/100ML; MG/100ML; MG/100ML
INJECTION, SOLUTION INTRAVENOUS CONTINUOUS PRN
Status: DISCONTINUED | OUTPATIENT
Start: 2022-12-01 | End: 2022-12-01 | Stop reason: SDUPTHER

## 2022-12-01 RX ORDER — LABETALOL HYDROCHLORIDE 5 MG/ML
10 INJECTION, SOLUTION INTRAVENOUS
Status: CANCELLED | OUTPATIENT
Start: 2022-12-01

## 2022-12-01 RX ORDER — SODIUM CHLORIDE, SODIUM LACTATE, POTASSIUM CHLORIDE, CALCIUM CHLORIDE 600; 310; 30; 20 MG/100ML; MG/100ML; MG/100ML; MG/100ML
INJECTION, SOLUTION INTRAVENOUS CONTINUOUS
Status: DISCONTINUED | OUTPATIENT
Start: 2022-12-01 | End: 2022-12-01 | Stop reason: HOSPADM

## 2022-12-01 RX ORDER — LIDOCAINE HYDROCHLORIDE 20 MG/ML
INJECTION, SOLUTION INFILTRATION; PERINEURAL PRN
Status: DISCONTINUED | OUTPATIENT
Start: 2022-12-01 | End: 2022-12-01 | Stop reason: SDUPTHER

## 2022-12-01 RX ORDER — SODIUM CHLORIDE 0.9 % (FLUSH) 0.9 %
5-40 SYRINGE (ML) INJECTION PRN
Status: CANCELLED | OUTPATIENT
Start: 2022-12-01

## 2022-12-01 RX ORDER — ONDANSETRON 2 MG/ML
4 INJECTION INTRAMUSCULAR; INTRAVENOUS
Status: CANCELLED | OUTPATIENT
Start: 2022-12-01 | End: 2022-12-02

## 2022-12-01 RX ORDER — OXYCODONE HYDROCHLORIDE 5 MG/1
5 TABLET ORAL PRN
Status: CANCELLED | OUTPATIENT
Start: 2022-12-01 | End: 2022-12-01

## 2022-12-01 RX ORDER — OXYCODONE HYDROCHLORIDE 5 MG/1
10 TABLET ORAL PRN
Status: CANCELLED | OUTPATIENT
Start: 2022-12-01 | End: 2022-12-01

## 2022-12-01 RX ADMIN — PROPOFOL 400 MG: 10 INJECTION, EMULSION INTRAVENOUS at 09:35

## 2022-12-01 RX ADMIN — SODIUM CHLORIDE, POTASSIUM CHLORIDE, SODIUM LACTATE AND CALCIUM CHLORIDE: 600; 310; 30; 20 INJECTION, SOLUTION INTRAVENOUS at 09:27

## 2022-12-01 RX ADMIN — LIDOCAINE HYDROCHLORIDE 100 MG: 20 INJECTION, SOLUTION INFILTRATION; PERINEURAL at 09:35

## 2022-12-01 NOTE — PROCEDURES
COLONOSCOPY     Patient: Isma Locke MRN: [id number]   YOB: 1975 Age: 52 y.o. Sex: male   Unit: [facility] Room/Bed: ENDO/NONE Location: [address]    Admitting Physician: Kp De Jesus     Primary Care Physician: Antonella Marion DO      DATE OF PROCEDURE: 12/1/2022  PROCEDURE: Colonoscopy    PREOPERATIVE DIAGNOSIS: SCREENING  HPI: This is a 52y.o. year old male who presents today with colorectal cancer screening. ENDOSCOPIST: Rusty Gleason DO    POSTOPERATIVE DIAGNOSIS:    4 small rectal polyps 3 to 4 mm in size and 1 sigmoid polyp 4 mm in size    PLAN:   Await results of biopsies  Call in a week for the    INFORMED CONSENT:  Informed consent for colonoscopy was obtained. The benefits and risks including adverse medicine reaction and perforation have been explained. The patient's questions were answered and the patient agreed to proceed. ASA: ASA 2 - Patient with mild systemic disease with no functional limitations     SEDATION: MAC    The patient's vital signs, cardiac status, pulmonary status, abdominal status and mental status were stable for the procedure. The patient's vital signs and respiratory function as monitored by oxygen saturation remained stable. COLON PREPARATION:  The patient was given a split colon preparation and the preparation was adequate. Procedure Details: An anal exam was performed and this was unremarkable. A digital rectal exam was performed and no masses palpated. The Olympus videocolonoscope  was inserted in the rectum and carefully advanced to the cecum as identified by IC valve, crow's foot appearance and appendix. The cecum was photodocumented. The colonoscope was slowly withdrawn and retrograde examination of the colon was carefully performed with inspection around and between folds. The ascending colon and cecum were intubated twice with repeat antegrade and retrograde examination.     There is 5 small polyps ranging in size from 3 to 4 mm located in the rectum and sigmoid colon were all removed with cold snare. Retroflexion in the rectum was performed.    Cecum Intubated: Yes          Estimated Blood Loss:  Minimal  Complications: None    Signed By: Nakul Dominguez DO

## 2022-12-01 NOTE — H&P
[address] [facility] ENDOSCOPY  Outpatient Procedure H&P    Patient: James Austin MRN: [id number]     YOB: 1975  Age: 52 y.o. Sex: male    Unit: [address] [place] [address] ENDOSCOPY Room/Bed: ENDO/NONE Location: [address]     Procedure: Procedure(s):  COLON W/ANES. (9:00)    Indication:screen  Referring  Physician:        Brief history: CRCS    Nurses past medical history notes reviewed and agreed. Medications reviewed. Allergies: Patient has no known allergies. Allergies noted: Yes     Past Medical History:   Past Medical History:   Diagnosis Date    Chronic back pain     I've had multiple back surgeries    Headache Aug 2021    Started getting mild headaches after i had covid    Lumbar stenosis     Respiratory arrest ([facility] [address] 2012    due to an jaw absess       Past Surgical History:   Past Surgical History:   Procedure Laterality Date    ANKLE SURGERY Right     [address]  [id number]    RIGHT L4-5, L5-S1 TRANSFORMINAL LUMBAR INTERBODY FUSION WITH       Social History:   Social History     Socioeconomic History    Marital status:      Spouse name: Not on file    Number of children: Not on file    Years of education: Not on file    Highest education level: Not on file   Occupational History    Not on file   Tobacco Use    Smoking status: Former     Packs/day: 2.00     Years: 15.00     Pack years: 30.00     Types: Cigarettes     Start date: 1995     Quit date: 2011     Years since quittin.2    Smokeless tobacco: Current     Types: Chew   Vaping Use    Vaping Use: Never used   Substance and Sexual Activity    Alcohol use:  Yes     Alcohol/week: 8.0 - 14.0 standard drinks     Types: 2 Glasses of wine, 6 - 12 Cans of beer per week    Drug use: No    Sexual activity: Yes     Partners: Female   Other Topics Concern    Not on file   Social History Narrative    Not on file     Social Determinants of Health     Financial Resource Strain: Low Risk     Difficulty of Paying Living Expenses: Not hard at all   Food Insecurity: No Food Insecurity    Worried About Running Out of Food in the Last Year: Never true    Ran Out of Food in the Last Year: Never true   Transportation Needs: Not on file   Physical Activity: Unknown    Days of Exercise per Week: Patient refused    Minutes of Exercise per Session: Not on file   Stress: Not on file   Social Connections: Not on file   Intimate Partner Violence: Not At Risk    Fear of Current or Ex-Partner: No    Emotionally Abused: No    Physically Abused: No    Sexually Abused: No   Housing Stability: Not on file       Family History:   Family History   Problem Relation Age of Onset    Other Neg Hx        Home Medications:   Prior to Admission medications    Medication Sig Start Date End Date Taking?  Authorizing Provider   cyclobenzaprine (FLEXERIL) 10 MG tablet Take 1 tablet by mouth 3 times daily as needed for Muscle spasms  Patient not taking: Reported on 11/30/2022 7/15/22   Vazquez Sierra DO   SUMAtriptan (IMITREX) 50 MG tablet Take 1 tablet by mouth once as needed for Migraine 7/15/22 9/29/22  Vazquez Sierra DO   ibuprofen (ADVIL;MOTRIN) 800 MG tablet Take 1,000 mg by mouth    Historical Provider, MD       Review of Systems:  Weight Loss: No  Dysphagia: No  Dyspepsia: No    Physical Exam:   Vital Signs: /76   Pulse 80   Temp 97.8 °F (36.6 °C)   Resp 20   Ht 5' 11\" (1.803 m)   Wt 244 lb (110.7 kg)   SpO2 97%   BMI 34.03 kg/m²   Vital signs reviewed:Yes    HEENT:Normal  Cardiac:Normal  Chest:Normal  Abdomen:Normal  Exts: Normal  Neuro:Normal    Labs:  Orders Only on 09/29/2022   Component Date Value Ref Range Status    Cholesterol, Total 09/29/2022 200 (A)  0 - 199 mg/dL Final    Triglycerides 09/29/2022 110  0 - 150 mg/dL Final    HDL 09/29/2022 49  40 - 60 mg/dL Final    LDL Calculated 09/29/2022 129 (A)  <100 mg/dL Final    VLDL Cholesterol Calculated 09/29/2022 22  Not Established mg/dL Final        Imaging:  No orders to display       ASA:2    Mallampati Score: II    Sedation planned:MAC    Patient in acceptable condition for procedure: Yes    8:50 AM 12/1/2022    Sangeeta Reveal, DO      Please note that some or all of this record was generated using voice recognition software. If there are any questions about the content of this document, please contact the author as some errors in transcription may have occurred. The patient and I discussed that this is an elective procedure/surgery. We discussed the risks of the procedure/surgery, including but not limited to what is outlined in the signed informed consent. We also discussed the risk of dez COVID 19 while in the facility. We discussed the increased risk of a bad outcome should the patient contract COVID 19 during the post-procedural/post-operative period, given the patients current health condition, chronic conditions, and the added risk of COVID 19 in light of these conditions. The patient and I also discussed the risk of further postponing the procedure/surgery and other treatment alternatives, including non-procedural/surgical treatments. Understanding all of the risks, benefits, and alternatives, the patient made an informed decision to proceed with the procedure/surgery.

## 2022-12-01 NOTE — ANESTHESIA PRE PROCEDURE
Department of Anesthesiology  Preprocedure Note       Name:  Alix Bauer   Age:  52 y.o.  :  1975                                          MRN:  [id number]         Date:  2022      Surgeon: Larisa Beard):  Kay Faith DO    Procedure: Procedure(s):  COLON W/ANES. (9:00)    Medications prior to admission:   Prior to Admission medications    Medication Sig Start Date End Date Taking?  Authorizing Provider   cyclobenzaprine (FLEXERIL) 10 MG tablet Take 1 tablet by mouth 3 times daily as needed for Muscle spasms  Patient not taking: Reported on 2022 7/15/22   Jolynn Alcantar DO   SUMAtriptan (IMITREX) 50 MG tablet Take 1 tablet by mouth once as needed for Migraine 7/15/22 9/29/22  Jolynn Alcantar DO   ibuprofen (ADVIL;MOTRIN) 800 MG tablet Take 1,000 mg by mouth    Historical Provider, MD       Current medications:    Current Facility-Administered Medications   Medication Dose Route Frequency Provider Last Rate Last Admin    lactated ringers infusion   IntraVENous Continuous Trinity Gleason DO           Allergies:  No Known Allergies    Problem List:    Patient Active Problem List   Diagnosis Code    Status post lumbar surgery Z98.890    DDD (degenerative disc disease), lumbar M51.36    Ankle instability, right M25.371    Peroneal tendon injury, right, initial encounter S86.301A    Right wrist pain M25.531    Right wrist tendinitis M77.8    TFC (triangular fibrocartilage complex) injury, right, initial encounter E68.62DI       Past Medical History:        Diagnosis Date    Chronic back pain     I've had multiple back surgeries    Headache Aug 2021    Started getting mild headaches after i had covid    Lumbar stenosis     Respiratory arrest ([facility] [address] 2012    due to an jaw absess       Past Surgical History:        Procedure Laterality Date    ANKLE SURGERY Right     [address]  [id number]    RIGHT L4-5, L5-S1 TRANSFORMINAL LUMBAR INTERBODY FUSION WITH       Social History:    Social History     Tobacco Use    Smoking status: Former     Packs/day: 2.00     Years: 15.00     Pack years: 30.00     Types: Cigarettes     Start date: 1995     Quit date: 2011     Years since quittin.2    Smokeless tobacco: Current     Types: Chew   Substance Use Topics    Alcohol use: Yes     Alcohol/week: 8.0 - 14.0 standard drinks     Types: 2 Glasses of wine, 6 - 12 Cans of beer per week                                Ready to quit: Not Answered  Counseling given: Not Answered      Vital Signs (Current):   Vitals:    22 1035 22 0827   BP:  123/76   Pulse:  80   Resp:  20   Temp:  97.8 °F (36.6 °C)   SpO2:  97%   Weight: 244 lb (110.7 kg)    Height: 5' 11\" (1.803 m)                                               BP Readings from Last 3 Encounters:   22 123/76   22 122/82   07/15/22 108/86       NPO Status: Time of last liquid consumption: 650                        Time of last solid consumption:                         Date of last liquid consumption: 22                        Date of last solid food consumption: 22    BMI:   Wt Readings from Last 3 Encounters:   22 244 lb (110.7 kg)   10/18/22 256 lb (116.1 kg)   10/11/22 256 lb (116.1 kg)     Body mass index is 34.03 kg/m².     CBC:   Lab Results   Component Value Date/Time    WBC 9.4 10/12/2013 07:36 AM    RBC 3.85 10/12/2013 07:36 AM    HGB 11.9 10/12/2013 07:36 AM    HCT 35.6 10/12/2013 07:36 AM    MCV 92.4 10/12/2013 07:36 AM    RDW 13.5 10/12/2013 07:36 AM     10/12/2013 07:36 AM       CMP:   Lab Results   Component Value Date/Time     2022 10:16 AM    K 4.2 2022 10:16 AM     2022 10:16 AM    CO2 23 2022 10:16 AM    BUN 13 2022 10:16 AM    CREATININE 0.9 2022 10:16 AM    GFRAA >60 2022 10:16 AM    AGRATIO 1.3 10/11/2013 01:21 PM    LABGLOM >60 2022 10:16 AM    GLUCOSE 89 2022 10:16 AM PROT 6.5 10/11/2013 01:21 PM    CALCIUM 8.7 07/21/2022 10:16 AM    BILITOT 1.1 10/11/2013 01:21 PM    ALKPHOS 54 10/11/2013 01:21 PM    AST 63 10/11/2013 01:21 PM    ALT 56 10/11/2013 01:21 PM       POC Tests: No results for input(s): POCGLU, POCNA, POCK, POCCL, POCBUN, POCHEMO, POCHCT in the last 72 hours. Coags:   Lab Results   Component Value Date/Time    PROTIME 13.3 09/25/2013 11:34 AM    INR 1.03 09/25/2013 11:34 AM    APTT 32.3 09/25/2013 11:34 AM       HCG (If Applicable): No results found for: PREGTESTUR, PREGSERUM, HCG, HCGQUANT     ABGs: No results found for: PHART, PO2ART, OVO3ZTK, VNA2WZG, BEART, Y5UJRCXZ     Type & Screen (If Applicable):  No results found for: LABABO, LABRH    Drug/Infectious Status (If Applicable):  No results found for: HIV, HEPCAB    COVID-19 Screening (If Applicable): No results found for: COVID19        Anesthesia Evaluation   no history of anesthetic complications:   Airway: Mallampati: II  TM distance: >3 FB   Neck ROM: full  Mouth opening: > = 3 FB   Dental: normal exam         Pulmonary:                             ROS comment: H/o tob   Cardiovascular:Negative CV ROS                      Neuro/Psych:   (+) neuromuscular disease:, headaches:,             GI/Hepatic/Renal: Neg GI/Hepatic/Renal ROS            Endo/Other: Negative Endo/Other ROS                    Abdominal:             Vascular: Other Findings:           Anesthesia Plan      MAC     ASA 2     (Risks, benefits and alternatives of MAC anesthesia discussed with pt. Questions answered. Willing to proceed.)  Induction: intravenous. Anesthetic plan and risks discussed with patient.                         Clementina Gomez MD   12/1/2022

## 2022-12-01 NOTE — DISCHARGE INSTRUCTIONS
PATIENT INSTRUCTIONS  POST-SEDATION    Humberto Alejandra          IMMEDIATELY FOLLOWING PROCEDURE:    Do not drive or operate machinery for the first twenty four hours after surgery. Do not make any important decisions for twenty four hours after surgery or while taking narcotic pain medications or sedatives. You should NOT BE LEFT UNATTENDED OR ALONE. A responsible adult should be with you for the rest of the day of your procedure and also during the night for your protection and safety. You may experience some light headedness. Rest at home with activity as tolerated. You may not need to go to bed, but it is important to rest for the next 24 hours. You should not engage in athletic sports such as basketball, volleyball, jogging, skating, or activities requiring refined motor skills for 24 hours. If you develop intractable nausea and vomiting or a severe headache please notify your doctor immediately. You are not expected to have any fever, but if you feel warm, take your temperature. If you have a fever 101 degrees or higher, call your doctor. If you have had an Endoscopy:   *Eat lightly for your first meal and gradually resume your normal / prescribed diet. DO NOT eat or drink until your gag reflex returns. *If you have a sore throat you may use lozenges, or salt water gargles. *If you have had a colonoscopy, do not expect a normal bowel movement for approximately three days due to the cleansing of the large intestine prior to colonoscopy. ONCE YOU ARE HOME, IF YOU SHOULD HAVE:  Difficulty in breathing, persistent nausea or vomiting, bleeding you feel is excessive, or pain that is unusual, increased abdominal bloating, or any swelling, fever / chills, call your physician. If you cannot contact your physician, but feel that your signs and symptoms need a physician's attention, go to the Emergency Department. FOLLOW-UP:    Please follow up with Dr. Joe Hartman as scheduled or needed.      Rosibel's office will call you with the biopsy findings. Call Dr. Julio Chavez if there are any GI concerns. [phone]      Repeat Colonoscopy based on polyp results. Colon Polyps: Care Instructions  Your Care Instructions     Colon polyps are growths in the colon or the rectum. The cause of most colon polyps is not known, and most people who get them do not have any problems. But a certain kind can turn into cancer. For this reason, regular testing for colon polyps is important for people as they get older. It is also important for anyone who has an increased risk for colon cancer. Polyps are usually found through routine colon cancer screening tests. Although most colon polyps are not cancerous, they are usually removed and then tested for cancer. Screening for colon cancer saves lives because the cancer can usually be cured if it is caught early. If you have a polyp that is the type that can turn into cancer, you may need more tests to examine your entire colon. The doctor will remove any other polyps that are found, and you will be tested more often. Follow-up care is a key part of your treatment and safety. Be sure to make and go to all appointments, and call your doctor if you are having problems. It's also a good idea to know your test results and keep a list of the medicines you take. How can you care for yourself at home? Regular exams to look for colon polyps are the best way to prevent polyps from turning into colon cancer. These can include stool tests, sigmoidoscopy, colonoscopy, and CT colonography. Talk with your doctor about a testing schedule that is right for you. To prevent polyps  There is no home treatment that can prevent colon polyps. But these steps may help lower your risk for cancer. Stay active. Being active can help you get to and stay at a healthy weight. Try to exercise on most days of the week. Walking is a good choice. Eat well.  Choose a variety of vegetables, fruits, legumes (such as peas and beans), fish, poultry, and whole grains. Do not smoke. If you need help quitting, talk to your doctor about stop-smoking programs and medicines. These can increase your chances of quitting for good. If you drink alcohol, limit how much you drink. Limit alcohol to 2 drinks a day for men and 1 drink a day for women. When should you call for help? Call your doctor now or seek immediate medical care if:    You have severe belly pain. Your stools are maroon or very bloody. Watch closely for changes in your health, and be sure to contact your doctor if:    You have a fever. You have nausea or vomiting. You have a change in bowel habits (new constipation or diarrhea). Your symptoms get worse or are not improving as expected. Where can you learn more? Go to https://[employer]. org and sign in to your [employer] account. Enter [phone] [id number] in the Lib[employer] box to learn more about \"Colon Polyps: Care Instructions. \"     If you do not have an account, please click on the \"Sign Up Now\" link. Current as of: June 6, 2022               Content Version: 13.4  © 5537-9266 [employer]. Care instructions adapted under license by [facility] ([facility]). If you have questions about a medical condition or this instruction, always ask your healthcare professional. [address] any warranty or liability for your use of this information.

## 2022-12-01 NOTE — ANESTHESIA POSTPROCEDURE EVALUATION
Department of Anesthesiology  Postprocedure Note    Patient: Rohit Ruano  MRN: [id number]  YOB: 1975  Date of evaluation: 12/1/2022      Procedure Summary     Date: 12/01/22 Room / Location: [facility] ENDO 01 / [facility]    Anesthesia Start: 2640 Anesthesia Stop: 1001    Procedure: COLONOSCOPY POLYPECTOMY SNARE/COLD BIOPSY Diagnosis:       Special screening for malignant neoplasms, colon      (SCREENING)    Surgeons: Larry Lobato DO Responsible Provider: Vicente Shearer MD    Anesthesia Type: MAC ASA Status: 2          Anesthesia Type: No value filed. Mary Jo Phase I: Mary Jo Score: 10    Mary Jo Phase II: Mary Jo Score: 8      Anesthesia Post Evaluation    Patient location during evaluation: PACU  Patient participation: complete - patient participated  Level of consciousness: awake and alert  Airway patency: patent  Nausea & Vomiting: no nausea and no vomiting  Complications: no  Cardiovascular status: blood pressure returned to baseline  Respiratory status: acceptable  Hydration status: euvolemic  Comments: VSS on transfer to phase 2 recovery. No anesthetic complications.

## 2023-09-27 DIAGNOSIS — M51.36 DDD (DEGENERATIVE DISC DISEASE), LUMBAR: ICD-10-CM

## 2023-09-27 RX ORDER — CYCLOBENZAPRINE HCL 10 MG
TABLET ORAL
Qty: 30 TABLET | Refills: 1 | Status: SHIPPED | OUTPATIENT
Start: 2023-09-27

## 2024-10-01 ENCOUNTER — OFFICE VISIT (OUTPATIENT)
Dept: FAMILY MEDICINE CLINIC | Age: 49
End: 2024-10-01
Payer: OTHER GOVERNMENT

## 2024-10-01 VITALS
HEIGHT: 71 IN | DIASTOLIC BLOOD PRESSURE: 80 MMHG | OXYGEN SATURATION: 97 % | BODY MASS INDEX: 34.44 KG/M2 | WEIGHT: 246 LBS | HEART RATE: 93 BPM | RESPIRATION RATE: 16 BRPM | SYSTOLIC BLOOD PRESSURE: 120 MMHG | TEMPERATURE: 98 F

## 2024-10-01 DIAGNOSIS — M51.360 DEGENERATION OF INTERVERTEBRAL DISC OF LUMBAR REGION WITH DISCOGENIC BACK PAIN: ICD-10-CM

## 2024-10-01 DIAGNOSIS — M54.50 ACUTE LEFT-SIDED LOW BACK PAIN WITHOUT SCIATICA: Primary | ICD-10-CM

## 2024-10-01 PROCEDURE — 99214 OFFICE O/P EST MOD 30 MIN: CPT | Performed by: STUDENT IN AN ORGANIZED HEALTH CARE EDUCATION/TRAINING PROGRAM

## 2024-10-01 RX ORDER — CYCLOBENZAPRINE HCL 10 MG
10 TABLET ORAL 3 TIMES DAILY PRN
Qty: 21 TABLET | Refills: 0 | Status: CANCELLED | OUTPATIENT
Start: 2024-10-01 | End: 2024-10-11

## 2024-10-01 RX ORDER — METHYLPREDNISOLONE 4 MG
TABLET, DOSE PACK ORAL
Qty: 1 KIT | Refills: 0 | Status: SHIPPED | OUTPATIENT
Start: 2024-10-01 | End: 2024-10-07

## 2024-10-01 SDOH — ECONOMIC STABILITY: INCOME INSECURITY: HOW HARD IS IT FOR YOU TO PAY FOR THE VERY BASICS LIKE FOOD, HOUSING, MEDICAL CARE, AND HEATING?: NOT VERY HARD

## 2024-10-01 SDOH — ECONOMIC STABILITY: FOOD INSECURITY: WITHIN THE PAST 12 MONTHS, THE FOOD YOU BOUGHT JUST DIDN'T LAST AND YOU DIDN'T HAVE MONEY TO GET MORE.: NEVER TRUE

## 2024-10-01 SDOH — ECONOMIC STABILITY: FOOD INSECURITY: WITHIN THE PAST 12 MONTHS, YOU WORRIED THAT YOUR FOOD WOULD RUN OUT BEFORE YOU GOT MONEY TO BUY MORE.: NEVER TRUE

## 2024-10-01 ASSESSMENT — PATIENT HEALTH QUESTIONNAIRE - PHQ9
SUM OF ALL RESPONSES TO PHQ QUESTIONS 1-9: 0
SUM OF ALL RESPONSES TO PHQ9 QUESTIONS 1 & 2: 0
SUM OF ALL RESPONSES TO PHQ QUESTIONS 1-9: 0
1. LITTLE INTEREST OR PLEASURE IN DOING THINGS: NOT AT ALL
2. FEELING DOWN, DEPRESSED OR HOPELESS: NOT AT ALL

## 2024-10-01 NOTE — PATIENT INSTRUCTIONS
- Medrol (oral steroid) with food,  no Ibuprofen (max dose 800 mg  3 times daily ) while taking  - Trial alternative muscle relaxant : Tizanidine 4mg 3 times daily as needed may cause drowsiness  Call if you prefer cyclobenzaprine   Report in 1 week if no better

## 2024-12-05 ENCOUNTER — TELEPHONE (OUTPATIENT)
Dept: FAMILY MEDICINE CLINIC | Age: 49
End: 2024-12-05

## (undated) DEVICE — ENDO CARRY-ON PROCEDURE KIT INCLUDES SUCTION TUBING, LUBRICANT, GAUZE, BIOHAZARD STICKER, TRANSPORT PAD AND INTERCEPT BEDSIDE KIT.: Brand: ENDO CARRY-ON PROCEDURE KIT

## (undated) DEVICE — SNARE ENDOSCP AD L240CM LOOP W10MM SHTH DIA2.4MM RND INSUL

## (undated) DEVICE — ELECTRODE,RADIOTRANSLUCENT,FOAM,3PK: Brand: MEDLINE

## (undated) DEVICE — TRAP POLYP ETRAP